# Patient Record
Sex: FEMALE | Race: WHITE | NOT HISPANIC OR LATINO | Employment: OTHER | URBAN - METROPOLITAN AREA
[De-identification: names, ages, dates, MRNs, and addresses within clinical notes are randomized per-mention and may not be internally consistent; named-entity substitution may affect disease eponyms.]

---

## 2023-04-03 DIAGNOSIS — G89.29 CHRONIC BILATERAL LOW BACK PAIN WITH LEFT-SIDED SCIATICA: ICD-10-CM

## 2023-04-03 DIAGNOSIS — M54.42 CHRONIC BILATERAL LOW BACK PAIN WITH LEFT-SIDED SCIATICA: ICD-10-CM

## 2023-04-03 RX ORDER — IBUPROFEN 600 MG/1
600 TABLET ORAL EVERY 8 HOURS PRN
COMMUNITY
Start: 2017-03-23 | End: 2023-04-03 | Stop reason: SDUPTHER

## 2023-04-03 RX ORDER — IBUPROFEN 600 MG/1
600 TABLET ORAL EVERY 8 HOURS PRN
Qty: 90 TABLET | Refills: 0 | Status: SHIPPED | OUTPATIENT
Start: 2023-04-03 | End: 2023-06-30

## 2023-04-16 DIAGNOSIS — J18.9 PNEUMONIA, UNSPECIFIED ORGANISM: ICD-10-CM

## 2023-04-17 RX ORDER — ALBUTEROL SULFATE 90 UG/1
AEROSOL, METERED RESPIRATORY (INHALATION)
Qty: 18 G | Refills: 0 | Status: SHIPPED | OUTPATIENT
Start: 2023-04-17 | End: 2024-04-02 | Stop reason: SDUPTHER

## 2023-05-03 ENCOUNTER — TELEPHONE (OUTPATIENT)
Dept: PRIMARY CARE | Facility: CLINIC | Age: 73
End: 2023-05-03
Payer: MEDICARE

## 2023-05-04 ENCOUNTER — OFFICE VISIT (OUTPATIENT)
Dept: PRIMARY CARE | Facility: CLINIC | Age: 73
End: 2023-05-04
Payer: MEDICARE

## 2023-05-04 VITALS
OXYGEN SATURATION: 96 % | HEART RATE: 115 BPM | DIASTOLIC BLOOD PRESSURE: 90 MMHG | RESPIRATION RATE: 18 BRPM | SYSTOLIC BLOOD PRESSURE: 150 MMHG | HEIGHT: 67 IN | BODY MASS INDEX: 31.8 KG/M2 | WEIGHT: 202.6 LBS

## 2023-05-04 DIAGNOSIS — M54.2 NECK PAIN: ICD-10-CM

## 2023-05-04 DIAGNOSIS — I10 HYPERTENSION, UNSPECIFIED TYPE: Primary | ICD-10-CM

## 2023-05-04 DIAGNOSIS — M48.02 CERVICAL STENOSIS OF SPINE: ICD-10-CM

## 2023-05-04 PROCEDURE — 3077F SYST BP >= 140 MM HG: CPT | Performed by: STUDENT IN AN ORGANIZED HEALTH CARE EDUCATION/TRAINING PROGRAM

## 2023-05-04 PROCEDURE — 99214 OFFICE O/P EST MOD 30 MIN: CPT | Performed by: STUDENT IN AN ORGANIZED HEALTH CARE EDUCATION/TRAINING PROGRAM

## 2023-05-04 PROCEDURE — 3080F DIAST BP >= 90 MM HG: CPT | Performed by: STUDENT IN AN ORGANIZED HEALTH CARE EDUCATION/TRAINING PROGRAM

## 2023-05-04 PROCEDURE — 93000 ELECTROCARDIOGRAM COMPLETE: CPT | Performed by: STUDENT IN AN ORGANIZED HEALTH CARE EDUCATION/TRAINING PROGRAM

## 2023-05-04 PROCEDURE — 1159F MED LIST DOCD IN RCRD: CPT | Performed by: STUDENT IN AN ORGANIZED HEALTH CARE EDUCATION/TRAINING PROGRAM

## 2023-05-04 PROCEDURE — 1160F RVW MEDS BY RX/DR IN RCRD: CPT | Performed by: STUDENT IN AN ORGANIZED HEALTH CARE EDUCATION/TRAINING PROGRAM

## 2023-05-04 RX ORDER — ASCORBIC ACID 500 MG
500 TABLET ORAL
COMMUNITY

## 2023-05-04 RX ORDER — LISINOPRIL 40 MG/1
40 TABLET ORAL DAILY
COMMUNITY
End: 2023-07-25 | Stop reason: SDUPTHER

## 2023-05-04 RX ORDER — CYANOCOBALAMIN 1000 UG/ML
INJECTION, SOLUTION INTRAMUSCULAR; SUBCUTANEOUS
COMMUNITY
End: 2024-06-03 | Stop reason: SDUPTHER

## 2023-05-04 RX ORDER — METOPROLOL SUCCINATE 25 MG/1
25 TABLET, EXTENDED RELEASE ORAL DAILY
Qty: 30 TABLET | Refills: 5 | Status: SHIPPED | OUTPATIENT
Start: 2023-05-04 | End: 2023-05-23 | Stop reason: SDUPTHER

## 2023-05-04 RX ORDER — CHOLECALCIFEROL (VITAMIN D3) 125 MCG
CAPSULE ORAL
COMMUNITY
Start: 2016-05-10

## 2023-05-04 RX ORDER — EPINEPHRINE 0.3 MG/.3ML
0.3 INJECTION SUBCUTANEOUS
COMMUNITY
End: 2023-11-06 | Stop reason: WASHOUT

## 2023-05-04 RX ORDER — SYRINGE AND NEEDLE,INSULIN,1ML 25GX1"
SYRINGE, EMPTY DISPOSABLE MISCELLANEOUS
COMMUNITY
Start: 2022-08-10 | End: 2023-10-25 | Stop reason: WASHOUT

## 2023-05-04 RX ORDER — HYDROCHLOROTHIAZIDE 12.5 MG/1
12.5 TABLET ORAL DAILY
COMMUNITY
End: 2023-11-06 | Stop reason: WASHOUT

## 2023-05-04 ASSESSMENT — PATIENT HEALTH QUESTIONNAIRE - PHQ9
1. LITTLE INTEREST OR PLEASURE IN DOING THINGS: NOT AT ALL
SUM OF ALL RESPONSES TO PHQ9 QUESTIONS 1 AND 2: 0
2. FEELING DOWN, DEPRESSED OR HOPELESS: NOT AT ALL

## 2023-05-04 NOTE — PATIENT INSTRUCTIONS
Please take your lisinopril 40mg along with the new medication of 25mg daily   We will check on your BP in 1 month.   Please bring in your BP cuff so we can compare your cuff to other.       Please stop downstairs to get your neck xray.

## 2023-05-04 NOTE — PROGRESS NOTES
Will try to replace Cardura with Tekturna     Once you stop the Cardura then start tamsulosin for urine frequency and night time urine issues     Start the tizanidine nightly for back pain      Subjective   Patient ID: Shawnee Allen is a 73 y.o. female who presents for Hypertension (Pt is here for her high  blood pressure yesterday.).    [facility] yesterday with BP upwards of 200s/110 asymptomatic  Pt was advised to proceed to the ED for which she refused. She presents today with blood pressure with continued elevation. She is currently having neck pain with movement laterally. She denies any chest pain, dizziness, sob, numbness or tingling.         Review of Systems    Objective   Physical Exam  Vitals reviewed.   Constitutional:       Appearance: Normal appearance.   Cardiovascular:      Rate and Rhythm: Regular rhythm. Tachycardia present.      Heart sounds: No murmur heard.  Pulmonary:      Effort: Pulmonary effort is normal. No respiratory distress.      Breath sounds: Normal breath sounds. No wheezing.   Musculoskeletal:      Cervical back: Neck supple.      Left lower leg: No edema.   Neurological:      Mental Status: She is alert.         Assessment/Plan   Diagnoses and all orders for this visit:  Hypertension, unspecified type  Comments:  poor control   start metoprolol suc 25mg daily   EKG performed in the clinic today without changes from previous.  Orders:  -     metoprolol succinate XL (Toprol-XL) 25 mg 24 hr tablet; Take 1 tablet (25 mg) by mouth once daily. Do not crush or chew.  -     ECG 12 lead (Clinic Performed)  Neck pain  Comments:  likely a strain will obtain xray   topical pain relievers recommended   If this does not subside consider physical therapy  Orders:  -     XR cervical spine complete 4-5 views; Future  Cervical stenosis of spine  -     XR cervical spine complete 4-5 views; Future

## 2023-05-23 ENCOUNTER — OFFICE VISIT (OUTPATIENT)
Dept: PRIMARY CARE | Facility: CLINIC | Age: 73
End: 2023-05-23
Payer: MEDICARE

## 2023-05-23 VITALS
OXYGEN SATURATION: 95 % | WEIGHT: 204.8 LBS | RESPIRATION RATE: 18 BRPM | HEIGHT: 67 IN | DIASTOLIC BLOOD PRESSURE: 82 MMHG | HEART RATE: 102 BPM | BODY MASS INDEX: 32.15 KG/M2 | SYSTOLIC BLOOD PRESSURE: 138 MMHG

## 2023-05-23 DIAGNOSIS — G89.29 CHRONIC LOW BACK PAIN WITHOUT SCIATICA, UNSPECIFIED BACK PAIN LATERALITY: ICD-10-CM

## 2023-05-23 DIAGNOSIS — F32.A ANXIETY AND DEPRESSION: ICD-10-CM

## 2023-05-23 DIAGNOSIS — M54.50 CHRONIC LOW BACK PAIN WITHOUT SCIATICA, UNSPECIFIED BACK PAIN LATERALITY: ICD-10-CM

## 2023-05-23 DIAGNOSIS — F41.9 ANXIETY AND DEPRESSION: ICD-10-CM

## 2023-05-23 DIAGNOSIS — E78.2 HYPERLIPIDEMIA, MIXED: ICD-10-CM

## 2023-05-23 DIAGNOSIS — I10 BENIGN ESSENTIAL HYPERTENSION: Primary | ICD-10-CM

## 2023-05-23 DIAGNOSIS — Z00.00 ANNUAL PHYSICAL EXAM: ICD-10-CM

## 2023-05-23 DIAGNOSIS — G25.0 ESSENTIAL TREMOR: ICD-10-CM

## 2023-05-23 DIAGNOSIS — I10 HYPERTENSION, UNSPECIFIED TYPE: ICD-10-CM

## 2023-05-23 PROBLEM — M54.2 CHRONIC NECK PAIN: Status: ACTIVE | Noted: 2023-05-23

## 2023-05-23 PROBLEM — D51.0 PERNICIOUS ANEMIA: Status: ACTIVE | Noted: 2023-05-23

## 2023-05-23 PROBLEM — M54.42 CHRONIC BILATERAL LOW BACK PAIN WITH LEFT-SIDED SCIATICA: Status: ACTIVE | Noted: 2023-05-23

## 2023-05-23 PROBLEM — E66.9 OBESITY (BMI 30-39.9): Status: ACTIVE | Noted: 2023-05-23

## 2023-05-23 PROCEDURE — 1170F FXNL STATUS ASSESSED: CPT | Performed by: STUDENT IN AN ORGANIZED HEALTH CARE EDUCATION/TRAINING PROGRAM

## 2023-05-23 PROCEDURE — 99214 OFFICE O/P EST MOD 30 MIN: CPT | Performed by: STUDENT IN AN ORGANIZED HEALTH CARE EDUCATION/TRAINING PROGRAM

## 2023-05-23 PROCEDURE — 3075F SYST BP GE 130 - 139MM HG: CPT | Performed by: STUDENT IN AN ORGANIZED HEALTH CARE EDUCATION/TRAINING PROGRAM

## 2023-05-23 PROCEDURE — 3079F DIAST BP 80-89 MM HG: CPT | Performed by: STUDENT IN AN ORGANIZED HEALTH CARE EDUCATION/TRAINING PROGRAM

## 2023-05-23 PROCEDURE — G0439 PPPS, SUBSEQ VISIT: HCPCS | Performed by: STUDENT IN AN ORGANIZED HEALTH CARE EDUCATION/TRAINING PROGRAM

## 2023-05-23 PROCEDURE — 1159F MED LIST DOCD IN RCRD: CPT | Performed by: STUDENT IN AN ORGANIZED HEALTH CARE EDUCATION/TRAINING PROGRAM

## 2023-05-23 PROCEDURE — 1160F RVW MEDS BY RX/DR IN RCRD: CPT | Performed by: STUDENT IN AN ORGANIZED HEALTH CARE EDUCATION/TRAINING PROGRAM

## 2023-05-23 RX ORDER — METOPROLOL SUCCINATE 25 MG/1
25 TABLET, EXTENDED RELEASE ORAL DAILY
Qty: 90 TABLET | Refills: 1 | Status: SHIPPED | OUTPATIENT
Start: 2023-05-23 | End: 2023-07-25 | Stop reason: DRUGHIGH

## 2023-05-23 ASSESSMENT — PATIENT HEALTH QUESTIONNAIRE - PHQ9
2. FEELING DOWN, DEPRESSED OR HOPELESS: NOT AT ALL
1. LITTLE INTEREST OR PLEASURE IN DOING THINGS: NOT AT ALL
SUM OF ALL RESPONSES TO PHQ9 QUESTIONS 1 AND 2: 0

## 2023-05-23 ASSESSMENT — ACTIVITIES OF DAILY LIVING (ADL)
DOING_HOUSEWORK: INDEPENDENT
DRESSING: INDEPENDENT
BATHING: INDEPENDENT
GROCERY_SHOPPING: INDEPENDENT
TAKING_MEDICATION: INDEPENDENT
MANAGING_FINANCES: INDEPENDENT

## 2023-05-23 NOTE — PROGRESS NOTES
"Subjective   Reason for Visit: Shawnee Allen is an 73 y.o. female here for a Medicare Wellness visit.     Past Medical, Surgical, and Family History reviewed and updated in chart.    Reviewed all medications by prescribing practitioner or clinical pharmacist (such as prescriptions, OTCs, herbal therapies and supplements) and documented in the medical record.    Lower back pain, chronic   Taking 600 mg ibuprofen daily due to pain   Stabbing pain intermittently   Better with movement   Chronic for years but worsening   Causing some difficulty walking     BP is doing much better than previously with the addition of the    Fear of falling   Feels that she is more cautious and avoiding places with lots of stairs or a lot of crowds   She is working on physical therapy on [employer]         Patient Care Team:  Stacy Arroyo DO as PCP - General  Stacy Arroyo DO as PCP - [employer] PCP     Review of Systems    Objective   Vitals:  /82   Pulse 102   Resp 18   Ht 1.702 m (5' 7\")   Wt 92.9 kg (204 lb 12.8 oz)   SpO2 95%   BMI 32.08 kg/m²       Physical Exam  Constitutional:       Appearance: Normal appearance.   Cardiovascular:      Rate and Rhythm: Normal rate and regular rhythm.      Heart sounds: No murmur heard.  Pulmonary:      Effort: Pulmonary effort is normal. No respiratory distress.      Breath sounds: Normal breath sounds. No wheezing.   Musculoskeletal:      Cervical back: Neck supple.      Left lower leg: No edema.   Neurological:      Mental Status: She is alert.         Assessment/Plan   Problem List Items Addressed This Visit          Nervous    Essential tremor    Current Assessment & Plan     Doing better with the addition of BB              Circulatory    Benign essential hypertension - Primary    Current Assessment & Plan     Doing much better   Continue lisinopril 40mg daily   Continue hydrochlorothiazide 12.5mg daily   Continue metoprolol 25mg daily             Other    " Hyperlipidemia, mixed    Anxiety and depression     Other Visit Diagnoses       Chronic low back pain without sciatica, unspecified back pain laterality        xray   chronic likely arthritis   recommended formal physical therapy but refuses as she does youtube videos  using ibuprofen 600mg daily  rec pain management    Relevant Orders    XR lumbar spine complete 4+ views    Hypertension, unspecified type        poor control   start metoprolol suc 25mg daily   EKG performed in the clinic today without changes from previous.    Relevant Medications    metoprolol succinate XL (Toprol-XL) 25 mg 24 hr tablet

## 2023-05-23 NOTE — ASSESSMENT & PLAN NOTE
Doing much better   Continue lisinopril 40mg daily   Continue hydrochlorothiazide 12.5mg daily   Continue metoprolol 25mg daily

## 2023-05-25 ENCOUNTER — TELEPHONE (OUTPATIENT)
Dept: PRIMARY CARE | Facility: CLINIC | Age: 73
End: 2023-05-25
Payer: MEDICARE

## 2023-06-08 DIAGNOSIS — G89.29 OTHER CHRONIC PAIN: Primary | ICD-10-CM

## 2023-06-26 DIAGNOSIS — G89.29 CHRONIC BILATERAL LOW BACK PAIN WITH LEFT-SIDED SCIATICA: ICD-10-CM

## 2023-06-26 DIAGNOSIS — M54.42 CHRONIC BILATERAL LOW BACK PAIN WITH LEFT-SIDED SCIATICA: ICD-10-CM

## 2023-06-30 RX ORDER — IBUPROFEN 600 MG/1
600 TABLET ORAL EVERY 8 HOURS PRN
Qty: 90 TABLET | Refills: 0 | Status: SHIPPED | OUTPATIENT
Start: 2023-06-30 | End: 2023-07-03 | Stop reason: SDUPTHER

## 2023-07-03 DIAGNOSIS — M54.42 CHRONIC BILATERAL LOW BACK PAIN WITH LEFT-SIDED SCIATICA: ICD-10-CM

## 2023-07-03 DIAGNOSIS — G89.29 CHRONIC BILATERAL LOW BACK PAIN WITH LEFT-SIDED SCIATICA: ICD-10-CM

## 2023-07-03 RX ORDER — IBUPROFEN 600 MG/1
600 TABLET ORAL EVERY 8 HOURS PRN
Qty: 90 TABLET | Refills: 0 | Status: SHIPPED | OUTPATIENT
Start: 2023-07-03

## 2023-07-03 NOTE — TELEPHONE ENCOUNTER
I called pt to inform, she said NO she can have Ibuprofen, it's the aleve she can not take. Would like this medication filled. .

## 2023-07-05 NOTE — TELEPHONE ENCOUNTER
Called and left a detailed message on her personal answering machine that the mediation was sent in and to call if she has any questions.

## 2023-07-24 PROBLEM — G25.0 BENIGN HEAD TREMOR: Status: ACTIVE | Noted: 2023-07-24

## 2023-07-24 PROBLEM — R79.89 ELEVATED LIVER FUNCTION TESTS: Status: ACTIVE | Noted: 2023-07-24

## 2023-07-24 PROBLEM — R73.9 ELEVATED BLOOD SUGAR: Status: ACTIVE | Noted: 2023-07-24

## 2023-07-24 PROBLEM — M54.12 RADICULITIS INVOLVING UPPER EXTREMITY: Status: ACTIVE | Noted: 2023-07-24

## 2023-07-24 PROBLEM — R50.9 FEVER: Status: ACTIVE | Noted: 2023-07-24

## 2023-07-25 ENCOUNTER — OFFICE VISIT (OUTPATIENT)
Dept: PRIMARY CARE | Facility: CLINIC | Age: 73
End: 2023-07-25
Payer: MEDICARE

## 2023-07-25 VITALS
DIASTOLIC BLOOD PRESSURE: 92 MMHG | HEIGHT: 67 IN | WEIGHT: 202.2 LBS | OXYGEN SATURATION: 95 % | HEART RATE: 89 BPM | RESPIRATION RATE: 17 BRPM | BODY MASS INDEX: 31.74 KG/M2 | SYSTOLIC BLOOD PRESSURE: 148 MMHG

## 2023-07-25 DIAGNOSIS — I10 HYPERTENSION, UNSPECIFIED TYPE: ICD-10-CM

## 2023-07-25 DIAGNOSIS — Z72.0 TOBACCO USE: ICD-10-CM

## 2023-07-25 DIAGNOSIS — M54.42 CHRONIC BILATERAL LOW BACK PAIN WITH LEFT-SIDED SCIATICA: ICD-10-CM

## 2023-07-25 DIAGNOSIS — I10 BENIGN ESSENTIAL HYPERTENSION: Primary | ICD-10-CM

## 2023-07-25 DIAGNOSIS — G89.29 CHRONIC BILATERAL LOW BACK PAIN WITH LEFT-SIDED SCIATICA: ICD-10-CM

## 2023-07-25 PROCEDURE — 1159F MED LIST DOCD IN RCRD: CPT | Performed by: STUDENT IN AN ORGANIZED HEALTH CARE EDUCATION/TRAINING PROGRAM

## 2023-07-25 PROCEDURE — 3080F DIAST BP >= 90 MM HG: CPT | Performed by: STUDENT IN AN ORGANIZED HEALTH CARE EDUCATION/TRAINING PROGRAM

## 2023-07-25 PROCEDURE — 99213 OFFICE O/P EST LOW 20 MIN: CPT | Performed by: STUDENT IN AN ORGANIZED HEALTH CARE EDUCATION/TRAINING PROGRAM

## 2023-07-25 PROCEDURE — 3077F SYST BP >= 140 MM HG: CPT | Performed by: STUDENT IN AN ORGANIZED HEALTH CARE EDUCATION/TRAINING PROGRAM

## 2023-07-25 PROCEDURE — 1160F RVW MEDS BY RX/DR IN RCRD: CPT | Performed by: STUDENT IN AN ORGANIZED HEALTH CARE EDUCATION/TRAINING PROGRAM

## 2023-07-25 RX ORDER — LISINOPRIL 40 MG/1
40 TABLET ORAL DAILY
Qty: 90 TABLET | Refills: 1 | Status: SHIPPED | OUTPATIENT
Start: 2023-07-25 | End: 2023-10-25 | Stop reason: SDUPTHER

## 2023-07-25 RX ORDER — METOPROLOL SUCCINATE 25 MG/1
50 TABLET, EXTENDED RELEASE ORAL DAILY
Qty: 90 TABLET | Refills: 1 | Status: SHIPPED | OUTPATIENT
Start: 2023-07-25 | End: 2023-11-13 | Stop reason: SDUPTHER

## 2023-07-25 NOTE — PROGRESS NOTES
Subjective   Patient ID: Shawnee Allen is a 73 y.o. female who presents for Follow-up (Pt is here for a follow up.).    Patient presents today for a follow-up to her high blood pressure.  Although it has improved we are still seeing blood pressures above the goal of 140/90 mmHg.  She denies any recurrent symptoms of dizziness..        Review of Systems    Objective   Physical Exam  Constitutional:       Appearance: Normal appearance.   Cardiovascular:      Rate and Rhythm: Normal rate and regular rhythm.      Heart sounds: No murmur heard.  Pulmonary:      Effort: Pulmonary effort is normal. No respiratory distress.      Breath sounds: Normal breath sounds. No wheezing.   Musculoskeletal:      Cervical back: Neck supple.      Left lower leg: No edema.   Neurological:      Mental Status: She is alert.         Assessment/Plan   Problem List Items Addressed This Visit       Chronic bilateral low back pain with left-sided sciatica     Reviewed pt xrays with her in the office today  Plan for her to follow up with pain management          Benign essential hypertension - Primary     Lisinopril 40mg daily   Increasing the metoprolol 50mg daily     Physical exam was stable. Discussed maintaining diets such as DASH to help maintain normal Blood pressure. Encouraged regular exercise for a total of 120 min weekly. We will fu labs in 1-3 days. For now there will be no change in medical management. All questions and concerns were addressed. Pt will follow up in 4-6 months or sooner if needed.            Relevant Medications    lisinopril 40 mg tablet     Other Visit Diagnoses       Hypertension, unspecified type        poor control    Relevant Medications    metoprolol succinate XL (Toprol-XL) 25 mg 24 hr tablet    Tobacco use        Relevant Orders    Vascular US abdominal aorta anuerysm AAA screening

## 2023-07-25 NOTE — ASSESSMENT & PLAN NOTE
Lisinopril 40mg daily   Increasing the metoprolol 50mg daily     Physical exam was stable. Discussed maintaining diets such as DASH to help maintain normal Blood pressure. Encouraged regular exercise for a total of 120 min weekly. We will fu labs in 1-3 days. For now there will be no change in medical management. All questions and concerns were addressed. Pt will follow up in 4-6 months or sooner if needed.

## 2023-08-08 ENCOUNTER — TELEPHONE (OUTPATIENT)
Dept: PRIMARY CARE | Facility: CLINIC | Age: 73
End: 2023-08-08
Payer: MEDICARE

## 2023-08-08 NOTE — TELEPHONE ENCOUNTER
Shawnee came in office to say she just got her ultrasound done today. She asked if when you get the results that you mail her a copy to her home address on file

## 2023-10-25 ENCOUNTER — OFFICE VISIT (OUTPATIENT)
Dept: PRIMARY CARE | Facility: CLINIC | Age: 73
End: 2023-10-25
Payer: MEDICARE

## 2023-10-25 VITALS
HEIGHT: 67 IN | RESPIRATION RATE: 17 BRPM | HEART RATE: 84 BPM | DIASTOLIC BLOOD PRESSURE: 80 MMHG | BODY MASS INDEX: 30.95 KG/M2 | OXYGEN SATURATION: 96 % | WEIGHT: 197.2 LBS | SYSTOLIC BLOOD PRESSURE: 170 MMHG

## 2023-10-25 DIAGNOSIS — I10 BENIGN ESSENTIAL HYPERTENSION: ICD-10-CM

## 2023-10-25 PROCEDURE — 3077F SYST BP >= 140 MM HG: CPT | Performed by: STUDENT IN AN ORGANIZED HEALTH CARE EDUCATION/TRAINING PROGRAM

## 2023-10-25 PROCEDURE — 1159F MED LIST DOCD IN RCRD: CPT | Performed by: STUDENT IN AN ORGANIZED HEALTH CARE EDUCATION/TRAINING PROGRAM

## 2023-10-25 PROCEDURE — 3079F DIAST BP 80-89 MM HG: CPT | Performed by: STUDENT IN AN ORGANIZED HEALTH CARE EDUCATION/TRAINING PROGRAM

## 2023-10-25 PROCEDURE — 99213 OFFICE O/P EST LOW 20 MIN: CPT | Performed by: STUDENT IN AN ORGANIZED HEALTH CARE EDUCATION/TRAINING PROGRAM

## 2023-10-25 PROCEDURE — 1160F RVW MEDS BY RX/DR IN RCRD: CPT | Performed by: STUDENT IN AN ORGANIZED HEALTH CARE EDUCATION/TRAINING PROGRAM

## 2023-10-25 RX ORDER — LISINOPRIL 40 MG/1
40 TABLET ORAL DAILY
Qty: 90 TABLET | Refills: 1 | Status: SHIPPED | OUTPATIENT
Start: 2023-10-25 | End: 2024-04-02 | Stop reason: SDUPTHER

## 2023-10-25 RX ORDER — SYRINGE WITH NEEDLE, 1 ML 27GX1/2"
SYRINGE, EMPTY DISPOSABLE MISCELLANEOUS
COMMUNITY
Start: 2023-07-19 | End: 2024-04-02 | Stop reason: SDUPTHER

## 2023-10-25 ASSESSMENT — PATIENT HEALTH QUESTIONNAIRE - PHQ9
1. LITTLE INTEREST OR PLEASURE IN DOING THINGS: NOT AT ALL
2. FEELING DOWN, DEPRESSED OR HOPELESS: NOT AT ALL
SUM OF ALL RESPONSES TO PHQ9 QUESTIONS 1 AND 2: 0

## 2023-10-25 NOTE — ASSESSMENT & PLAN NOTE
Pt is non-compliant with medications   Will refer to cardiology   Metoprolol 50mg daily   Lisinopril 40mg daily     Advised to take medications as prescribed     Physical exam was stable. Discussed maintaining diets such as DASH to help maintain normal Blood pressure. Encouraged regular exercise for a total of 120 min weekly. We will fu labs in 1-3 days. For now there will be no change in medical management. All questions and concerns were addressed. Pt will follow up in 4-6 months or sooner if needed.

## 2023-10-25 NOTE — PROGRESS NOTES
Subjective   Patient ID: Shawnee Allen is a 73 y.o. female who presents for Follow-up (Pt is here for a 3 month follow up.).    Patient presents to the office today for hypertension follow-up.  Patient states that she has not been taking her medications as prescribed.  She is currently only taking 25 mg metoprolol daily.  She denies any dizziness, headaches, nausea or vomiting symptoms.  She states that she has been very reluctant to increase any medications due to her high blood pressure.          Objective   Physical Exam  Constitutional:       Appearance: Normal appearance.   Cardiovascular:      Rate and Rhythm: Normal rate and regular rhythm.      Heart sounds: No murmur heard.  Pulmonary:      Effort: Pulmonary effort is normal. No respiratory distress.      Breath sounds: Normal breath sounds. No wheezing.   Musculoskeletal:      Cervical back: Neck supple.      Left lower leg: No edema.   Neurological:      Mental Status: She is alert.       Assessment/Plan   Problem List Items Addressed This Visit             ICD-10-CM    Benign essential hypertension I10     Pt is non-compliant with medications   Will refer to cardiology   Metoprolol 50mg daily   Lisinopril 40mg daily     Advised to take medications as prescribed     Physical exam was stable. Discussed maintaining diets such as DASH to help maintain normal Blood pressure. Encouraged regular exercise for a total of 120 min weekly. We will fu labs in 1-3 days. For now there will be no change in medical management. All questions and concerns were addressed. Pt will follow up in 4-6 months or sooner if needed.            Relevant Medications    lisinopril 40 mg tablet    Other Relevant Orders    Referral to Cardiology

## 2023-10-31 DIAGNOSIS — D51.0 PERNICIOUS ANEMIA: Primary | ICD-10-CM

## 2023-10-31 PROBLEM — C44.92 SCC (SQUAMOUS CELL CARCINOMA): Status: ACTIVE | Noted: 2023-10-31

## 2023-11-06 ENCOUNTER — OFFICE VISIT (OUTPATIENT)
Dept: CARDIOLOGY | Facility: CLINIC | Age: 73
End: 2023-11-06
Payer: MEDICARE

## 2023-11-06 VITALS
OXYGEN SATURATION: 96 % | HEIGHT: 67 IN | BODY MASS INDEX: 29.98 KG/M2 | DIASTOLIC BLOOD PRESSURE: 77 MMHG | WEIGHT: 191 LBS | SYSTOLIC BLOOD PRESSURE: 162 MMHG | RESPIRATION RATE: 18 BRPM | HEART RATE: 92 BPM

## 2023-11-06 DIAGNOSIS — R73.09 OTHER ABNORMAL GLUCOSE: ICD-10-CM

## 2023-11-06 DIAGNOSIS — I10 BENIGN ESSENTIAL HYPERTENSION: ICD-10-CM

## 2023-11-06 DIAGNOSIS — E78.2 HYPERLIPIDEMIA, MIXED: Primary | ICD-10-CM

## 2023-11-06 PROCEDURE — 3077F SYST BP >= 140 MM HG: CPT | Performed by: STUDENT IN AN ORGANIZED HEALTH CARE EDUCATION/TRAINING PROGRAM

## 2023-11-06 PROCEDURE — 1160F RVW MEDS BY RX/DR IN RCRD: CPT | Performed by: STUDENT IN AN ORGANIZED HEALTH CARE EDUCATION/TRAINING PROGRAM

## 2023-11-06 PROCEDURE — 1159F MED LIST DOCD IN RCRD: CPT | Performed by: STUDENT IN AN ORGANIZED HEALTH CARE EDUCATION/TRAINING PROGRAM

## 2023-11-06 PROCEDURE — 99204 OFFICE O/P NEW MOD 45 MIN: CPT | Performed by: STUDENT IN AN ORGANIZED HEALTH CARE EDUCATION/TRAINING PROGRAM

## 2023-11-06 PROCEDURE — 3078F DIAST BP <80 MM HG: CPT | Performed by: STUDENT IN AN ORGANIZED HEALTH CARE EDUCATION/TRAINING PROGRAM

## 2023-11-06 PROCEDURE — 99214 OFFICE O/P EST MOD 30 MIN: CPT | Performed by: STUDENT IN AN ORGANIZED HEALTH CARE EDUCATION/TRAINING PROGRAM

## 2023-11-06 RX ORDER — PRAVASTATIN SODIUM 40 MG/1
40 TABLET ORAL DAILY
Qty: 30 TABLET | Refills: 11 | Status: SHIPPED | OUTPATIENT
Start: 2023-11-06 | End: 2024-11-05

## 2023-11-06 RX ORDER — AMLODIPINE BESYLATE 10 MG/1
10 TABLET ORAL DAILY
Qty: 30 TABLET | Refills: 11 | Status: SHIPPED | OUTPATIENT
Start: 2023-11-06 | End: 2024-04-02 | Stop reason: SDUPTHER

## 2023-11-06 ASSESSMENT — ENCOUNTER SYMPTOMS: DEPRESSION: 0

## 2023-11-06 ASSESSMENT — PATIENT HEALTH QUESTIONNAIRE - PHQ9
1. LITTLE INTEREST OR PLEASURE IN DOING THINGS: NOT AT ALL
SUM OF ALL RESPONSES TO PHQ9 QUESTIONS 1 AND 2: 1
2. FEELING DOWN, DEPRESSED OR HOPELESS: SEVERAL DAYS

## 2023-11-06 NOTE — PATIENT INSTRUCTIONS
Patient Instructions:  If you have any questions or need cardiac medication refills, please call the Heart Failure office at [phone], option [phone].   You may also contact the  Heart Failure Nursing team via email at [email or web address] (Please include your name and date of birth).      To reach Dr. Webb's office please call [phone] / Fax: [phone].  To schedule an appointment call [phone].    - stop smoking  - measure your blood pressure at home  - Continue current medications with the exception of:   -- start amlodipine 10mg daily  -- start pravastatin 40mg daily  - Labwork: get labs   - Imaging/Procedures: none  - Results: I will send a LiveLeaf message with any notable results. You may also call the HF nursing team (contact details above) for your results.   - Referrals: orthopedics  - Followup: 1 year

## 2023-11-06 NOTE — PROGRESS NOTES
"Patient ID: Shawnee Allen is a 73 y.o. female.    Subjective    HPI  Treatment Synopsis:    PROBLEM LIST:  1. Pernicious anemia and vitamin B12 deficiency.  2. Hypertension.  3. Osteoarthritis.  4. Malabsorption syndrome.  5. GERD.  6. Coronary artery disease.  7. Fatty liver disease seen on ultrasound of the liver in August 2017.     PAST SURGICAL HISTORY:  1. Status post breast reduction surgery.  2. Status post resection of her right laron colon in 2014.  3. Status post hysterectomy.     INTERVAL HISTORY: The patient comes in today for her routine follow-up visit for her vitamin B12 deficiency.  She continues to give herself vitamin B12 injections once every 10-12 days. She states that when she waits morethan 10 days before the next injection, she starts feeling poorly. She has been having difficulty with her bowels witha spastic colon. She has  been having colonoscopies every two years. She has some difficulty sleeping, specificallyfalling asleep. She will wake up just as she falls asleep with the urge to urinate and sweaty. The rest of the 14-point review of systems was reviewed and negative  except as noted above.    73-year-old woman with a history of hypertension, osteoarthritis, GERD, fatty liver disease on ultrasound of the liver in August 2017, coronary artery disease, malabsorption  syndrome, followed by us for pernicious anemia and B12 deficiency which she has had since her age 20s.  She injects herself every 10 days, but if she goes beyond 10 days feels tired and her thinking is not good.        no fatigue,   sometimes shortness of breath, [she is smoking on and off again]  weakness in her legs  anxiousness: yes. \"my mind is always working.\"    poor sleep: yes   inability to concentrate at times   subpar memory   loose stools: yes  numbness and tingling sensations in toes and fingers: sometimes (usualy in a thigh)   altered taste: no    tinnitus: yes   difficulties with movement in the morning.  Has " "a cane.  no falls. She feels her balance is off  she has tremors in her neck.  \"I feel there is a lot of neurological.\"      LABORATORY: WBC of 5.1, hemoglobin of 17.2, platelet count of 254, 000. Creatinine of 1.0. Calcium of 10.5.  Normal transaminase. Normal total bilirubin. Sodium 134, potassium of 3.8, LDH of 155, vitamin B12 of 935,    Objective    BSA: There is no height or weight on file to calculate BSA.  There were no vitals taken for this visit.   Review of Systems    Physical Exam    Performance Status:        Assessment/Plan   Summary: 73-year-old woman with a history of hypertension, osteoarthritis, GERD, fatty liver disease on ultrasound of the liver in August 2017, coronary artery disease, malabsorption syndrome, followed by us for pernicious anemia and B12 deficiency.     Right hemicolectomy and terminal ileum resection(9/12/2014) no invasive carcinoma identified     Pernicious anemia/B12 deficiency   She was diagnosed in her 20s, requiring B12 injections and ultimately given the diagnosis of pernicious anemia.  At 1 point she was taking B12 injections every week.  She underwent a right hemicolectomy in 2014.  Onset: In her 20s   Low B12 level: . High methylmalonic acid: .   Recent B12 level: 2000(4/11/2023).    Ferritin 129 (4/11/2023 ) TIBC 254   Recent methylmalonic acid: .   Family history of B12 deficiency: no.   EGD: never done.   serum holotranscobalamin (holoTC): not done.   Risk groups member: no  Zollinger-Wellington syndrome: not established. [used to get heartburn]  No GAVE syndrome ((Gastric Antral Vascular Ectasia, blood vessels in the lining of the stomach become fragile and become prone to rupture, commonly called watermelon stomach) can cause pernicious anemia   No Ileal condition (resection or disease): Crohn’s: no. Blind loop syndrome: no. chronic pancreatitis: no. Gastrinoma: no. Parasites (fish tapeworm): no. Giardiasis: not established. bacterial overgrowth:    Malnutrition: by " "report. Reduced intake of animal products:  no . vegan diet: no.  celiac: no.    No high risk medications (proton pump inhibitors, nitrous oxide, metformin, Colchicine, Potassium supplements such as K-Dur, Micro-K, Slow-K, K-Lyte, phenytoin, Primidone, cholestyramine, methotrexate).   Alcohol: occasionally.  no drunkedness  Clinical features: fatigue: no. Anemia: no. Macrocytosis: no. No Hyperpigmentation.    Neurology: No distal sensory impairment. No decreased taste. No difficulties with movement.     No decreased vibratory sense. No Areflexia. No disturbance in proprioception. some impaired memory. can get irritability. No involuntary movements. No hypotonia. No other neurological features. No glossitis. No cardiomyopathy.  +tinnitus.     sensitive spots on skin  No myelosis funicularis (1. Impaired perception of deep touch, pressure and vibration, loss of sense of touch, very annoying and persistent paresthesias 2. Ataxia of dorsal column type 3. Decrease or loss of deep muscle-tendon reflexes 4. Pathological  reflexes (Babinski, Rossolimo (percussion of the tips of the toes causes an exaggerated flexion of the toes) and severe paresis).    Had a neuroMRI scan: no.    She is claustrophobic  no metal in eyes.  No pacemaker  (Look for neural tissue spongiform state, edema of fibers, deficiency of tissue, myelin decays, axial fiber decays. In later phases, fibric sclerosis of nervous tissues occurs. On \"precontrast FLAIR image\": abnormal lesions in the periventricular area  suggesting white matter pathology.)   has neck tremors. She noted \"when she gets anxious.\" Has not seen a neurologist.  Response to treatment: partial.   (Bone marrow biopsy should be done in cases where the B12 deficiency diagnosis is unclear, or blood counts show that the patient is not responding to adequate treatment.)    PLAN:  continue B12 injections Q 10 days  B12, copper level  RTC 6months     Malabsorption syndrome/ Resection right " colon 2014  Vitamin 25 D(4/11/2023) 28  taking 1000 International Unit(s) daily     GERD  asymptomatic  not taking an antacid.        CAD  no angina     Fatty liver Aug 2017  No transaminitis(1/19/2023)  She notes some alcohol intake     neck pain  s/p breast reduction surgery to help     HTN  on lisinopril/HCTZ  PLAN:   recommend home checking and record in a notebook.  Dr. Arroyo is PCP  recommended ED visit, but patient left AMA     Breast reduction surgery      Plan:    Continue B12 injections Q 2 weeks, B12 level 935'  OV 1 year       Cancer Staging   No matching staging information was found for the patient.    Oncology History    No history exists.                 Harish Cevallos MD

## 2023-11-06 NOTE — PROGRESS NOTES
Primary Care Physician: Stacy Arroyo DO  Primary Cardiologist:  none  Referring: Stacy Arroyo DO    Date of Visit: 11/06/2023  1:00 PM EST  Location of visit: [address]   Type of Visit: Consult    HPI / Summary:   Shawnee Allen is a very pleasant 73 y.o. female referred for management of hypertension. She has a history of HTN, HLD, overweight, tobacco use, aortic calcification, arthritis, anxiety, pernicious anemia.      She is accompanied by: self    Has been very active but had decline in function with COVID. Feels like she does not go out as much. Has started doing therapy and youtube videos for anxiety. In the past month she walks around block as most activity.. She uses a cane for balance. Concerned about falls. Has a BP cuff at home usually 150/70s usually. She has neuropathy and gets back pain when walking,     Has seen Dr. Stacy Arroyo and had consistently elevated BPs desptie lisinopril and metoprolol. Was referred for management. She is hesitant to take meds.     She denies: dyspnea at rest, LE swelling, syncope, PND, orthopnea, chest pain, palpitation.    ROS: ROS Full 10 pt review of symptoms of negative unless discussed above.     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 She packs 1/2 ppd of tobacco, Has smoked for many years (quit in her 30s but restarted). Has tried vaping  Notes that father had aortic aneurysm and required surgery.     Objective Problems:   Patient Active Problem List   Diagnosis    Chronic neck pain    Chronic bilateral low back pain with left-sided sciatica    Hyperlipidemia, mixed    Obesity (BMI 30-39.9)    Pernicious anemia    Benign essential hypertension    Anxiety and depression    Essential tremor    Adenomatous polyp of colon    Elevated blood sugar    Elevated liver function tests    Fever    Radiculitis involving upper extremity    Benign head tremor    Polyp of colon, adenomatous    SCC (squamous cell carcinoma)     Medical History:   No past medical  "history on file.  Surgical Hx:   Past Surgical History:   Procedure Laterality Date    APPENDECTOMY  05/10/2016    Appendectomy    BREAST SURGERY  05/10/2016    Breast Surgery Reduction Procedure    COLON SURGERY  05/10/2016    Colon Surgery    COLONOSCOPY  05/10/2016    Colonoscopy (Fiberoptic)    HYSTERECTOMY  05/10/2016    Hysterectomy      Social Hx:   Tobacco Use: High Risk (11/6/2023)    Patient History     Smoking Tobacco Use: Some Days     Smokeless Tobacco Use: Never     Passive Exposure: Not on file     Alcohol Use: Not on file     Family Hx:   Family History   Problem Relation Name Age of Onset    Esophageal cancer Mother      Lung cancer Mother      Allergies Mother      Arthritis Mother      Heart disease Mother      Hypertension Mother      Bone cancer Father      Heart disease Father      Hypertension Father      Allergies Father      Arthritis Father       Allergies:   Allergies   Allergen Reactions    Naproxen Sodium Shortness of breath    Ampicillin Unknown    Bee Venom Protein (Honey Bee) Unknown    Erythromycin Unknown    Diazepam Hives and Rash     IV    Penicillins Rash     Exam:   Vitals:       11/6/2023    12:52 PM 10/25/2023    11:40 AM 10/25/2023    11:27 AM 7/25/2023    11:10 AM 5/23/2023    11:07 AM 5/4/2023     4:22 PM   Vitals   Systolic 162 170 188 148 138 150   Diastolic 77 80 92 92 82 90   Heart Rate 92  84 89 102    Resp 18  17 17 18    Height (in) 1.702 m (5' 7\")  1.702 m (5' 7\") 1.702 m (5' 7\") 1.702 m (5' 7\")    Weight (lb) 191  197.2 202.2 204.8    BMI 29.91 kg/m2  30.89 kg/m2 31.67 kg/m2 32.08 kg/m2    BSA (m2) 2.02 m2  2.06 m2 2.08 m2 2.1 m2    Visit Report Report Report Report Report Report Report     GEN: Pleasant, well-appearing, no acute distress.  HEENT: JVP not elevated, no icterus. No HJR. No bruit  CHEST: No wheeze, good air movement.  CV: Normal rate, regular rhythm. Normal S1, split S2, no m/r/g  ABD: Soft, ND, NT  EXT: Warm, well perfused, No LE edema.   NEURO: " "Pleasant, Oriented to plan    Labs:   CMP:  Recent Labs     04/26/23  1107 08/29/22  1202 08/24/21  1119 09/22/20  1125 12/04/19  1145 06/03/19  1110 05/22/19  1135 11/02/18  1435    137 134 137 137   < > 138 135   K 3.7 3.8 3.8 4.1 3.8   < > 3.8 3.9    101 101 104 102   < > 105 103   CO2 25 24 22* 26 25   < > 25 21*   ANIONGAP 15 16 11 8 10   < > 8 11   BUN 11 12 11 13 16   < > 14 13   CREATININE 0.89 0.81 1.0 0.9 0.9   < > 0.8 0.8   EGFR  --   --  58 66 66  --  75 76    < > = values in this interval not displayed.     Recent Labs     04/26/23  1107 08/29/22  1202 08/24/21  1119 09/22/20  1125 12/04/19  1145   ALBUMIN 4.5 4.5 4.5 4.5 4.4   ALKPHOS 77 81 90 68 74   ALT 22 19 33 18 18   AST 15 15 19 13 13   BILITOT 0.5 0.4 0.5 0.3 0.3     CBC:  Recent Labs     04/26/23  1107 08/29/22  1202 08/24/21  1119 09/22/20  1125 12/04/19  1145   WBC 5.2 6.3 5.1 5.5 5.3   HGB 15.6 15.7 17.2* 16.1* 16.0*   HCT 45.9 46.9* 49.5* 47.8* 48.6*    289 254 245 240   MCV 94 94 93.9 95.0 96.8     COAG: No results for input(s): \"PTT\", \"INR\", \"HAUF\", \"DDIMERVTE\", \"HAPTOGLOBIN\", \"FIBRINOGEN\" in the last 00817 hours.  ABO: No results for input(s): \"ABO\" in the last 05491 hours.  HEME/ENDO:  Recent Labs     04/26/23  1107 09/06/22  1539 08/29/22  1202 06/04/19  0953   FERRITIN 417*  --  324*  --    IRONSAT 32 27  --   --    HGBA1C  --   --   --  5.6      CARDIAC:   Recent Labs     08/24/21  1119 09/22/20  1125 12/04/19  1145 05/22/19  1135 11/02/18  1435    147 167 167 157     Recent Labs     06/03/19  1110   CHOL 286*   LDLF 192*   HDL 63.5   TRIG 152*     MICRO: No results for input(s): \"ESR\", \"CRP\", \"PROCAL\" in the last 66723 hours.  No results found for the last 90 days.    Notable Studies: imaging personally reviewed   EKG:  Encounter Date: 05/04/23   ECG 12 lead (Clinic Performed)    Narrative    Sinus tach   Similar to previous        Echocardiogram: No results found for this or any previous visit from the " "past 1825 days.    Stress Testing: No results found for this or any previous visit from the past 1825 days.    Cardiac Catheterization: No results found for this or any previous visit from the past 1825 days.  No results found for this or any previous visit from the past 3650 days.     Cardiac Scoring: No results found for this or any previous visit from the past 1825 days.    AAA :   AAA Screen Aorta/Common Iliac Arteries/IVC: Negative AAA screening. Calcified plaque seen throughout aorta noted. 8/10/2023     OTHER: No results found for this or any previous visit from the past 1825 days.    ASCVD Risk: The ASCVD Risk score (Mirtha MIGUEL, et al., 2019) failed to calculate for the following reasons:    Cannot find a previous HDL lab    Cannot find a previous total cholesterol lab    Current Outpatient Medications   Medication Instructions    albuterol 90 mcg/actuation inhaler INHALE 1 TO 2 PUFFS BY MOUTH EVERY 4 TO 6 HOURS AS NEEDED    amLODIPine (NORVASC) 10 mg, oral, Daily    ascorbic acid (VITAMIN C) 500 mg, oral, Daily RT    carbamide peroxide (Debrox) 6.5 % otic solution 5 drops, otic (ear)    cholecalciferol (Vitamin D-3) 125 MCG (5000 UT) capsule oral    cyanocobalamin (Vitamin B-12) 1,000 mcg/mL injection INJECT 1000 MICROGRAM(S) INTRAMUSCULARLY EVERY 2 WEEKS    ibuprofen 600 mg, oral, Every 8 hours PRN    lisinopril 40 mg, oral, Daily    melatonin 10 mg tablet,chewable oral    metoprolol succinate XL (TOPROL-XL) 50 mg, oral, Daily, Do not crush or chew.    pravastatin (PRAVACHOL) 40 mg, oral, Daily    syringe with needle 1 mL 25 gauge x 1\" syringe USE EVERY 2 WEEKS WITH B12 INJECTIONS      Notable Therapies   Class  Agent    ARNI / ACE / ARB  Lisinopril 40mg daily    Beta-Blocker  Metoprolol succinate 50mg daily    MRA      SGLT2      Diuretic      Hydralazine/ISDN      Anticoagulation      Anti-arrhythmic      Antiplatelet      Lipid-Lowering  START Pravastatin 40mg daily    Other   START amlodipine 10mg daily "    Device(s)      Cardiac Rehab, if EF <35/MI       Problems Addressed:   # Hypertension: Last BP: 162/77. On ACE/BB. Would recommend CCB with amlodipine 10mg daily. Goal BP <130/75  # Hyperlipidemia: last checked in 2019 and abnormal, known aortic plaque. Start pravastatin 40mg daily  # Tobacco: tobacco cessation counselling with referral.   - 10 year ASCVD risk 64% based on current risk (driven by HTN, tobacco and hyperlipidemia). Explained risk stroke, MI, renal failure with current risk profile. Recommended aggressive BP, lipid control. Additionally check for type II DM.   - She is willing to start these medications  - strongly recommended tobacco cessation. Declines referral to CV tobacco cessation group, will quit on her own.   - additional benefit to jennifer peña for diet/nutrition support for DASH diet.     # Back pain: orthopedics referral    Patient Instructions   Patient Instructions:  If you have any questions or need cardiac medication refills, please call the Heart Failure office at [phone], option [phone].   You may also contact the  Heart Failure Nursing team via email at [email or web address] (Please include your name and date of birth).      To reach Dr. Webb's office please call [phone] / Fax: [phone].  To schedule an appointment call [phone].    - stop smoking  - measure your blood pressure at home  - Continue current medications with the exception of:   -- start amlodipine 10mg daily  -- start pravastatin 40mg daily  - Labwork: get labs   - Imaging/Procedures: none  - Results: I will send a [employer] message with any notable results. You may also call the HF nursing team (contact details above) for your results.   - Referrals: orthopedics  - Followup: 1 year     Orders:  Orders Placed This Encounter   Procedures    Lipid panel    Hemoglobin A1C    Referral to Jennifer Peña    Referral to Orthopaedic Surgery      Followup Appts:  Future Appointments   Date Time Provider  Department Center   12/5/2023 10:30 AM Harish Cevallos MD [id number] [facility]   1/25/2024 10:20 AM Stacy Arroyo DO [id number] [facility]   11/11/2024  2:00 PM Luke Webb MD [id number] [facility]        ____________________________________________________________  Luke Webb MD  Section of Advanced Heart Failure and Cardiac Transplantation  Division of Cardiovascular Medicine  [facility]  [facility]

## 2023-11-08 ENCOUNTER — APPOINTMENT (OUTPATIENT)
Dept: HEMATOLOGY/ONCOLOGY | Facility: CLINIC | Age: 73
End: 2023-11-08
Payer: MEDICARE

## 2023-11-12 DIAGNOSIS — I10 HYPERTENSION, UNSPECIFIED TYPE: ICD-10-CM

## 2023-11-13 RX ORDER — METOPROLOL SUCCINATE 25 MG/1
25 TABLET, EXTENDED RELEASE ORAL DAILY
Qty: 90 TABLET | Refills: 1 | OUTPATIENT
Start: 2023-11-13

## 2023-11-13 RX ORDER — METOPROLOL SUCCINATE 25 MG/1
50 TABLET, EXTENDED RELEASE ORAL DAILY
Qty: 90 TABLET | Refills: 1 | Status: SHIPPED | OUTPATIENT
Start: 2023-11-13 | End: 2024-03-05

## 2023-12-03 NOTE — PROGRESS NOTES
Patient ID: Shawnee Allen is a 73 y.o. female.    Subjective    HPI  PROBLEM LIST:  1. Pernicious anemia and vitamin B12 deficiency.  2. Hypertension.  3. Osteoarthritis.  4. Malabsorption syndrome.  5. GERD.  6. Coronary artery disease.  7. Fatty liver disease seen on ultrasound of the liver in August 2017.     PAST SURGICAL HISTORY:  1. Status post breast reduction surgery.  2. Status post resection of her right laron colon in 2014.  3. Status post hysterectomy.     INTERVAL HISTORY: The patient comes in today for her routine follow-up visit for her vitamin B12 deficiency.  She continues to give herself vitamin B12 injections once every 10-12 days. She states that when she waits morethan 10 days before the next injection, she starts feeling poorly. She has been having difficulty with her bowels witha spastic colon. She has  been having colonoscopies every two years. She has some difficulty sleeping, specificallyfalling asleep. She will wake up just as she falls asleep with the urge to urinate and sweaty. The rest of the 14-point review of systems was reviewed and negative  except as noted above.       Objective    BSA: There is no height or weight on file to calculate BSA.  There were no vitals taken for this visit.     Physical Exam    Performance Status:        Assessment/Plan   Summary: 73-year-old woman with a history of hypertension, osteoarthritis, GERD, fatty liver disease on ultrasound of the liver in August 2017, coronary artery disease, malabsorption syndrome, followed by us for pernicious anemia and B12 deficiency.     Right hemicolectomy and terminal ileum resection(9/12/2014) no invasive carcinoma identified     Pernicious anemia/B12 deficiency   She was diagnosed in her 20s, requiring B12 injections and ultimately given the diagnosis of pernicious anemia.  At 1 point she was taking B12 injections every week.  She underwent a right hemicolectomy in 2014.  Onset: In her 20s   Low B12 level: . High  methylmalonic acid: .   Recent B12 level: 2000(4/11/2023).    Ferritin 129 (4/11/2023 ) TIBC 254   Recent methylmalonic acid: .   Family history of B12 deficiency: no.   EGD: never done.   serum holotranscobalamin (holoTC): not done.   Risk groups member: no  Zollinger-Wellington syndrome: not established. [used to get heartburn]  No GAVE syndrome ((Gastric Antral Vascular Ectasia, blood vessels in the lining of the stomach become fragile and become prone to rupture, commonly called watermelon stomach) can cause pernicious anemia   No Ileal condition (resection or disease): Crohn’s: no. Blind loop syndrome: no. chronic pancreatitis: no. Gastrinoma: no. Parasites (fish tapeworm): no. Giardiasis: not established. bacterial overgrowth:    Malnutrition: by report. Reduced intake of animal products:  no . vegan diet: no.  celiac: no.    No high risk medications (proton pump inhibitors, nitrous oxide, metformin, Colchicine, Potassium supplements such as K-Dur, Micro-K, Slow-K, K-Lyte, phenytoin, Primidone, cholestyramine, methotrexate).   Alcohol: occasionally.  no drunkedness  Clinical features: fatigue: no. Anemia: no. Macrocytosis: no. No Hyperpigmentation.    Neurology: No distal sensory impairment. No decreased taste. No difficulties with movement.     No decreased vibratory sense. No Areflexia. No disturbance in proprioception. some impaired memory. can get irritability. No involuntary movements. No hypotonia. No other neurological features. No glossitis. No cardiomyopathy.  +tinnitus.     sensitive spots on skin  No myelosis funicularis (1. Impaired perception of deep touch, pressure and vibration, loss of sense of touch, very annoying and persistent paresthesias 2. Ataxia of dorsal column type 3. Decrease or loss of deep muscle-tendon reflexes 4. Pathological  reflexes (Babinski, Rossolimo (percussion of the tips of the toes causes an exaggerated flexion of the toes) and severe paresis).    Had a neuroMRI scan: no.   "  She is claustrophobic  no metal in eyes.  No pacemaker  (Look for neural tissue spongiform state, edema of fibers, deficiency of tissue, myelin decays, axial fiber decays. In later phases, fibric sclerosis of nervous tissues occurs. On \"precontrast FLAIR image\": abnormal lesions in the periventricular area  suggesting white matter pathology.)   has neck tremors. She noted \"when she gets anxious.\" Has not seen a neurologist.  Response to treatment: partial.   (Bone marrow biopsy should be done in cases where the B12 deficiency diagnosis is unclear, or blood counts show that the patient is not responding to adequate treatment.)    PLAN:  continue B12 injections Q 10 days  B12, copper level  RTC 6months     Malabsorption syndrome/ Resection right colon 2014  Vitamin 25 D(4/11/2023) 28  taking 1000 International Unit(s) daily     GERD  asymptomatic  not taking an antacid.        CAD  no angina     Fatty liver Aug 2017  No transaminitis(1/19/2023)  She notes some alcohol intake     neck pain  s/p breast reduction surgery to help     HTN  on lisinopril/HCTZ  PLAN:   recommend home checking and record in a notebook.  Dr. Arroyo is PCP  recommended ED visit, but patient left AMA     Breast reduction surgery      Plan:    Continue B12 injections Q 2 weeks, B12 level 935'  OV 1 year    Cancer Staging   No matching staging information was found for the patient.    Oncology History    No history exists.              Harish Cevallos MD            "

## 2023-12-05 ENCOUNTER — APPOINTMENT (OUTPATIENT)
Dept: HEMATOLOGY/ONCOLOGY | Facility: CLINIC | Age: 73
End: 2023-12-05
Payer: MEDICARE

## 2024-01-08 ENCOUNTER — LAB (OUTPATIENT)
Dept: LAB | Facility: CLINIC | Age: 74
End: 2024-01-08
Payer: MEDICARE

## 2024-01-08 DIAGNOSIS — D51.0 PERNICIOUS ANEMIA: ICD-10-CM

## 2024-01-08 LAB
ALBUMIN SERPL BCP-MCNC: 4.3 G/DL (ref 3.4–5)
ALP SERPL-CCNC: 73 U/L (ref 33–136)
ALT SERPL W P-5'-P-CCNC: 18 U/L (ref 7–45)
ANION GAP SERPL CALC-SCNC: 14 MMOL/L (ref 10–20)
AST SERPL W P-5'-P-CCNC: 14 U/L (ref 9–39)
BASOPHILS # BLD AUTO: 0.02 X10*3/UL (ref 0–0.1)
BASOPHILS NFR BLD AUTO: 0.3 %
BILIRUB SERPL-MCNC: 0.4 MG/DL (ref 0–1.2)
BUN SERPL-MCNC: 14 MG/DL (ref 6–23)
CALCIUM SERPL-MCNC: 9.8 MG/DL (ref 8.6–10.3)
CHLORIDE SERPL-SCNC: 103 MMOL/L (ref 98–107)
CO2 SERPL-SCNC: 24 MMOL/L (ref 21–32)
CREAT SERPL-MCNC: 0.77 MG/DL (ref 0.5–1.05)
EGFRCR SERPLBLD CKD-EPI 2021: 81 ML/MIN/1.73M*2
EOSINOPHIL # BLD AUTO: 0.04 X10*3/UL (ref 0–0.4)
EOSINOPHIL NFR BLD AUTO: 0.6 %
ERYTHROCYTE [DISTWIDTH] IN BLOOD BY AUTOMATED COUNT: 13.2 % (ref 11.5–14.5)
GLUCOSE SERPL-MCNC: 118 MG/DL (ref 74–99)
HCT VFR BLD AUTO: 45.1 % (ref 36–46)
HGB BLD-MCNC: 14.9 G/DL (ref 12–16)
IMM GRANULOCYTES # BLD AUTO: 0.06 X10*3/UL (ref 0–0.5)
IMM GRANULOCYTES NFR BLD AUTO: 0.9 % (ref 0–0.9)
IRON SATN MFR SERPL: 25 % (ref 25–45)
IRON SERPL-MCNC: 103 UG/DL (ref 35–150)
LYMPHOCYTES # BLD AUTO: 2.14 X10*3/UL (ref 0.8–3)
LYMPHOCYTES NFR BLD AUTO: 31.8 %
MCH RBC QN AUTO: 30.6 PG (ref 26–34)
MCHC RBC AUTO-ENTMCNC: 33 G/DL (ref 32–36)
MCV RBC AUTO: 93 FL (ref 80–100)
MONOCYTES # BLD AUTO: 0.4 X10*3/UL (ref 0.05–0.8)
MONOCYTES NFR BLD AUTO: 6 %
NEUTROPHILS # BLD AUTO: 4.06 X10*3/UL (ref 1.6–5.5)
NEUTROPHILS NFR BLD AUTO: 60.4 %
NRBC BLD-RTO: 0 /100 WBCS (ref 0–0)
PLATELET # BLD AUTO: 278 X10*3/UL (ref 150–450)
POTASSIUM SERPL-SCNC: 3.6 MMOL/L (ref 3.5–5.3)
PROT SERPL-MCNC: 7.1 G/DL (ref 6.4–8.2)
RBC # BLD AUTO: 4.87 X10*6/UL (ref 4–5.2)
SODIUM SERPL-SCNC: 137 MMOL/L (ref 136–145)
TIBC SERPL-MCNC: 414 UG/DL (ref 240–445)
UIBC SERPL-MCNC: 311 UG/DL (ref 110–370)
VIT B12 SERPL-MCNC: 564 PG/ML (ref 211–911)
WBC # BLD AUTO: 6.7 X10*3/UL (ref 4.4–11.3)

## 2024-01-08 PROCEDURE — 83921 ORGANIC ACID SINGLE QUANT: CPT

## 2024-01-08 PROCEDURE — 83540 ASSAY OF IRON: CPT

## 2024-01-08 PROCEDURE — 82607 VITAMIN B-12: CPT

## 2024-01-08 PROCEDURE — 85025 COMPLETE CBC W/AUTO DIFF WBC: CPT

## 2024-01-08 PROCEDURE — 36415 COLL VENOUS BLD VENIPUNCTURE: CPT

## 2024-01-08 PROCEDURE — 83010 ASSAY OF HAPTOGLOBIN QUANT: CPT

## 2024-01-08 PROCEDURE — 84075 ASSAY ALKALINE PHOSPHATASE: CPT

## 2024-01-09 LAB — HAPTOGLOB SERPL-MCNC: 293 MG/DL (ref 37–246)

## 2024-01-10 PROBLEM — F41.8 MIXED ANXIETY DEPRESSIVE DISORDER: Status: ACTIVE | Noted: 2023-05-23

## 2024-01-10 PROBLEM — R79.89 ABNORMAL LIVER FUNCTION TESTS: Status: ACTIVE | Noted: 2022-11-22

## 2024-01-10 PROBLEM — I10 BENIGN ESSENTIAL HYPERTENSION: Status: ACTIVE | Noted: 2018-10-18

## 2024-01-10 PROBLEM — D51.0 VITAMIN B12 DEFICIENCY ANEMIA DUE TO INTRINSIC FACTOR DEFICIENCY: Status: ACTIVE | Noted: 2018-10-18

## 2024-01-10 PROBLEM — M54.50 LOW BACK PAIN, UNSPECIFIED: Status: ACTIVE | Noted: 2023-05-23

## 2024-01-10 PROBLEM — C44.92 SQUAMOUS CELL CARCINOMA OF SKIN: Status: ACTIVE | Noted: 2023-10-31

## 2024-01-10 PROBLEM — M54.12 BRACHIAL RADICULITIS: Status: ACTIVE | Noted: 2018-11-12

## 2024-01-10 PROBLEM — E78.2 MIXED HYPERLIPIDEMIA: Status: ACTIVE | Noted: 2018-10-18

## 2024-01-10 NOTE — PROGRESS NOTES
"Patient ID: Shawnee Allen is a 73 y.o. female.    Subjective    HPI  73-year-old woman with a history of hypertension, osteoarthritis, GERD, fatty liver disease on ultrasound of the liver in August 2017, coronary artery disease, malabsorption  syndrome, followed by us for pernicious anemia and B12 deficiency which she has had since her age 20s.  She injects herself every 10 days, but if she goes beyond 10 days feels tired and her thinking is not good.       INTERVAL HISTORY: Shawnee comes in for routine follow-up visit for vitamin B12 deficiency.  She continues to give herself vitamin B12 injections once every 10-12 days. She states that when she waits morethan 10 days before the next injection, she starts feeling poorly. She has been having difficulty with her bowels witha spastic colon. She has  been having colonoscopies every two years. She has some difficulty sleeping, specificallyfalling asleep. She will wake up just as she falls asleep with the urge to urinate and sweaty. The rest of the 14-point review of systems was reviewed and negative  except as noted above.     no fatigue,   sometimes shortness of breath, [she is smoking on and off again]  weakness in her legs  anxiousness: yes. \"my mind is always working.\"    poor sleep: yes   inability to concentrate at times   subpar memory   loose stools: yes  numbness and tingling sensations in toes and fingers: sometimes (usualy in a thigh)   altered taste: no    tinnitus: yes   difficulties with movement in the morning.  Has a cane.  no falls. She feels her balance is off  she has tremors in her neck.  \"I feel there is a lot of neurological.\"     PROBLEM LIST:  1. Pernicious anemia and vitamin B12 deficiency.  2. Hypertension.  3. Osteoarthritis.  4. Malabsorption syndrome.  5. GERD.  6. Coronary artery disease.  7. Fatty liver disease seen on ultrasound of the liver in August 2017.     PAST SURGICAL HISTORY:  1. Status post breast reduction surgery.  2. " Status post resection of her right laron colon in 2014.  3. Status post hysterectomy.        LABORATORY: WBC of 5.1, hemoglobin of 17.2, platelet count of 254, 000. Creatinine of 1.0. Calcium of 10.5.  Normal transaminase. Normal total bilirubin. Sodium 134, potassium of 3.8, LDH of 155, vitamin B12 of 935,    Objective    BSA: There is no height or weight on file to calculate BSA.  There were no vitals taken for this visit.   Review of Systems   Constitutional:  Positive for diaphoresis. Negative for fatigue.   HENT:  Negative for trouble swallowing.    Eyes:  Negative for visual disturbance.   Respiratory:  Negative for shortness of breath.    Cardiovascular:  Negative for leg swelling.   Gastrointestinal:  Negative for blood in stool.   Endocrine: Positive for cold intolerance.   Genitourinary:  Negative for dysuria.   Musculoskeletal:  Positive for gait problem.   Skin:  Negative for rash.   Allergic/Immunologic: Negative for environmental allergies.   Neurological:  Negative for headaches.   Hematological:  Bruises/bleeds easily.   Psychiatric/Behavioral:  Positive for sleep disturbance.        Physical Exam  Constitutional:       Appearance: She is obese.   HENT:      Head: Normocephalic and atraumatic.      Right Ear: External ear normal.      Left Ear: External ear normal.      Nose: Nose normal.      Mouth/Throat:      Mouth: Mucous membranes are moist.      Pharynx: Oropharynx is clear.   Eyes:      Conjunctiva/sclera: Conjunctivae normal.      Pupils: Pupils are equal, round, and reactive to light.   Cardiovascular:      Rate and Rhythm: Normal rate and regular rhythm.      Heart sounds: No murmur heard.     No friction rub. No gallop.   Pulmonary:      Effort: Pulmonary effort is normal.      Breath sounds: Normal breath sounds.   Abdominal:      General: Bowel sounds are normal.   Musculoskeletal:         General: Normal range of motion.      Cervical back: Normal range of motion.   Skin:     General:  Skin is warm and dry.   Neurological:      General: No focal deficit present.      Mental Status: She is alert and oriented to person, place, and time.   Psychiatric:         Mood and Affect: Mood normal.         Behavior: Behavior normal.         Performance Status:        Assessment/Plan   Summary: 73-year-old woman with a history of hypertension, osteoarthritis, GERD, fatty liver disease on ultrasound of the liver in August 2017, coronary artery disease, malabsorption syndrome, followed by us for pernicious anemia and B12 deficiency.     Right hemicolectomy and terminal ileum resection(9/12/2014) no invasive carcinoma identified     Pernicious anemia/B12 deficiency   She was diagnosed in her 20s, requiring B12 injections and ultimately given the diagnosis of pernicious anemia.  At 1 point she was taking B12 injections every week.  She underwent a right hemicolectomy in 2014.  Onset: In her 20s   Low B12 level: . High methylmalonic acid: .   B12 level: 2000(4/11/2023).    Ferritin 129 (4/11/2023 ) TIBC 254   Recent methylmalonic acid: .   Family history of B12 deficiency: no.   EGD: never done.   serum holotranscobalamin (holoTC): not done.   Risk groups member: no  Zollinger-Wellington syndrome: not established. [used to get heartburn]  No GAVE syndrome ((Gastric Antral Vascular Ectasia, blood vessels in the lining of the stomach become fragile and become prone to rupture, commonly called watermelon stomach) can cause pernicious anemia   No Ileal condition (resection or disease): Crohn’s: no. Blind loop syndrome: no. chronic pancreatitis: no. Gastrinoma: no. Parasites (fish tapeworm): no. Giardiasis: not established. bacterial overgrowth:    Malnutrition: by report. Reduced intake of animal products:  no . vegan diet: no.  celiac: no.    No high risk medications (proton pump inhibitors, nitrous oxide, metformin, Colchicine, Potassium supplements such as K-Dur, Micro-K, Slow-K, K-Lyte, phenytoin, Primidone,  "cholestyramine, methotrexate).   Alcohol: occasionally.  no drunkedness  Clinical features: fatigue: no. Anemia: no. Macrocytosis: no. No Hyperpigmentation.    Neurology: No distal sensory impairment. No decreased taste. No difficulties with movement.     No decreased vibratory sense. No Areflexia. No disturbance in proprioception. some impaired memory. can get irritability. No involuntary movements. No hypotonia. No other neurological features. No glossitis. No cardiomyopathy.  +tinnitus.     sensitive spots on skin  No myelosis funicularis (1. Impaired perception of deep touch, pressure and vibration, loss of sense of touch, very annoying and persistent paresthesias 2. Ataxia of dorsal column type 3. Decrease or loss of deep muscle-tendon reflexes 4. Pathological  reflexes (Babinski, Rossolimo (percussion of the tips of the toes causes an exaggerated flexion of the toes) and severe paresis).    Had a neuroMRI scan: no.    She is claustrophobic  no metal in eyes.  No pacemaker  (Look for neural tissue spongiform state, edema of fibers, deficiency of tissue, myelin decays, axial fiber decays. In later phases, fibric sclerosis of nervous tissues occurs. On \"precontrast FLAIR image\": abnormal lesions in the periventricular area  suggesting white matter pathology.)   has neck tremors. She noted \"when she gets anxious.\" Has not seen a neurologist.  Response to treatment: partial.   (Bone marrow biopsy should be done in cases where the B12 deficiency diagnosis is unclear, or blood counts show that the patient is not responding to adequate treatment.)  1/8/2024 normal CBC, B12 584  PLAN:  continue B12 injections Q 10 days  B12, copper level  RTC 1year     Malabsorption syndrome/ Resection right colon 2014  Vitamin 25 D(4/11/2023) 28  taking 1000 International Unit(s) daily     GERD  asymptomatic  not taking an antacid.        CAD  no angina     Fatty liver Aug 2017  No transaminitis(1/19/2023)  She notes some alcohol " intake, vodka     neck pain  s/p breast reduction surgery to help     HTN  on lisinopril/HCTZ  PLAN:   recommend home checking and record in a notebook.  Dr. Arroyo is PCP  recommended ED visit, but patient left AMA     Cancer screening  Status post breast reduction surgery  Recommended bilateral mammogram at least once every 2 years if not yearly              Harish Cevallos MD

## 2024-01-11 LAB — METHYLMALONATE SERPL-SCNC: 0.17 UMOL/L (ref 0–0.4)

## 2024-01-12 ENCOUNTER — OFFICE VISIT (OUTPATIENT)
Dept: HEMATOLOGY/ONCOLOGY | Facility: CLINIC | Age: 74
End: 2024-01-12
Payer: MEDICARE

## 2024-01-12 VITALS
HEART RATE: 109 BPM | DIASTOLIC BLOOD PRESSURE: 80 MMHG | SYSTOLIC BLOOD PRESSURE: 155 MMHG | TEMPERATURE: 96.6 F | RESPIRATION RATE: 18 BRPM | BODY MASS INDEX: 30.66 KG/M2 | OXYGEN SATURATION: 92 % | WEIGHT: 195.77 LBS

## 2024-01-12 DIAGNOSIS — D51.0 PERNICIOUS ANEMIA: ICD-10-CM

## 2024-01-12 DIAGNOSIS — R79.9 ABNORMAL FINDING OF BLOOD CHEMISTRY, UNSPECIFIED: ICD-10-CM

## 2024-01-12 DIAGNOSIS — E53.8 B12 DEFICIENCY: Primary | ICD-10-CM

## 2024-01-12 PROCEDURE — 99214 OFFICE O/P EST MOD 30 MIN: CPT | Performed by: INTERNAL MEDICINE

## 2024-01-12 PROCEDURE — 3077F SYST BP >= 140 MM HG: CPT | Performed by: INTERNAL MEDICINE

## 2024-01-12 PROCEDURE — 3079F DIAST BP 80-89 MM HG: CPT | Performed by: INTERNAL MEDICINE

## 2024-01-12 RX ORDER — CYANOCOBALAMIN 1000 UG/ML
INJECTION, SOLUTION INTRAMUSCULAR; SUBCUTANEOUS
Qty: 1 ML | Refills: 11 | Status: CANCELLED
Start: 2024-02-15

## 2024-01-12 RX ORDER — SYRINGE WITH NEEDLE, 1 ML 27GX1/2"
SYRINGE, EMPTY DISPOSABLE MISCELLANEOUS
Qty: 2 EACH | Refills: 11 | Status: CANCELLED
Start: 2024-02-15

## 2024-01-12 ASSESSMENT — ENCOUNTER SYMPTOMS
BRUISES/BLEEDS EASILY: 1
DIAPHORESIS: 1
DEPRESSION: 1
LOSS OF SENSATION IN FEET: 0
TROUBLE SWALLOWING: 0
SLEEP DISTURBANCE: 1
SHORTNESS OF BREATH: 0
FATIGUE: 0
HEADACHES: 0
DYSURIA: 0
BLOOD IN STOOL: 0
OCCASIONAL FEELINGS OF UNSTEADINESS: 0

## 2024-01-12 ASSESSMENT — PATIENT HEALTH QUESTIONNAIRE - PHQ9
2. FEELING DOWN, DEPRESSED OR HOPELESS: SEVERAL DAYS
SUM OF ALL RESPONSES TO PHQ9 QUESTIONS 1 AND 2: 2
10. IF YOU CHECKED OFF ANY PROBLEMS, HOW DIFFICULT HAVE THESE PROBLEMS MADE IT FOR YOU TO DO YOUR WORK, TAKE CARE OF THINGS AT HOME, OR GET ALONG WITH OTHER PEOPLE: NOT DIFFICULT AT ALL
1. LITTLE INTEREST OR PLEASURE IN DOING THINGS: SEVERAL DAYS

## 2024-01-12 ASSESSMENT — COLUMBIA-SUICIDE SEVERITY RATING SCALE - C-SSRS
1. IN THE PAST MONTH, HAVE YOU WISHED YOU WERE DEAD OR WISHED YOU COULD GO TO SLEEP AND NOT WAKE UP?: NO
2. HAVE YOU ACTUALLY HAD ANY THOUGHTS OF KILLING YOURSELF?: NO
6. HAVE YOU EVER DONE ANYTHING, STARTED TO DO ANYTHING, OR PREPARED TO DO ANYTHING TO END YOUR LIFE?: NO

## 2024-01-12 NOTE — PATIENT INSTRUCTIONS
Follow up in 1 year.     Please feel free to call with any questions or concerns at [phone].

## 2024-01-12 NOTE — PROGRESS NOTES
"Patient here today for follow up. She was seen by cardiology and no cardiac issues of note. Card did start at statin but caused intolerable joint and neck pain.     Fatigue: \"so-so\"    Medications reviewed with patient.     Recent labs printed and given to patient per her request.   Follow up in 1 year with labs.     "

## 2024-01-25 ENCOUNTER — APPOINTMENT (OUTPATIENT)
Dept: PRIMARY CARE | Facility: CLINIC | Age: 74
End: 2024-01-25
Payer: MEDICARE

## 2024-02-29 DIAGNOSIS — I10 HYPERTENSION, UNSPECIFIED TYPE: ICD-10-CM

## 2024-03-05 RX ORDER — METOPROLOL SUCCINATE 25 MG/1
50 TABLET, EXTENDED RELEASE ORAL DAILY
Qty: 180 TABLET | Refills: 0 | Status: SHIPPED | OUTPATIENT
Start: 2024-03-05 | End: 2024-04-02 | Stop reason: SDUPTHER

## 2024-04-02 ENCOUNTER — OFFICE VISIT (OUTPATIENT)
Dept: PRIMARY CARE | Facility: CLINIC | Age: 74
End: 2024-04-02
Payer: MEDICARE

## 2024-04-02 VITALS
RESPIRATION RATE: 16 BRPM | WEIGHT: 197.4 LBS | SYSTOLIC BLOOD PRESSURE: 138 MMHG | HEIGHT: 66 IN | HEART RATE: 107 BPM | DIASTOLIC BLOOD PRESSURE: 62 MMHG | BODY MASS INDEX: 31.72 KG/M2 | OXYGEN SATURATION: 97 %

## 2024-04-02 DIAGNOSIS — I10 HYPERTENSION, UNSPECIFIED TYPE: ICD-10-CM

## 2024-04-02 DIAGNOSIS — E78.2 HYPERLIPIDEMIA, MIXED: ICD-10-CM

## 2024-04-02 DIAGNOSIS — I10 BENIGN ESSENTIAL HYPERTENSION: ICD-10-CM

## 2024-04-02 DIAGNOSIS — D51.0 PERNICIOUS ANEMIA: Primary | ICD-10-CM

## 2024-04-02 PROCEDURE — 3075F SYST BP GE 130 - 139MM HG: CPT | Performed by: STUDENT IN AN ORGANIZED HEALTH CARE EDUCATION/TRAINING PROGRAM

## 2024-04-02 PROCEDURE — 3078F DIAST BP <80 MM HG: CPT | Performed by: STUDENT IN AN ORGANIZED HEALTH CARE EDUCATION/TRAINING PROGRAM

## 2024-04-02 PROCEDURE — 1159F MED LIST DOCD IN RCRD: CPT | Performed by: STUDENT IN AN ORGANIZED HEALTH CARE EDUCATION/TRAINING PROGRAM

## 2024-04-02 PROCEDURE — 99214 OFFICE O/P EST MOD 30 MIN: CPT | Performed by: STUDENT IN AN ORGANIZED HEALTH CARE EDUCATION/TRAINING PROGRAM

## 2024-04-02 PROCEDURE — 1160F RVW MEDS BY RX/DR IN RCRD: CPT | Performed by: STUDENT IN AN ORGANIZED HEALTH CARE EDUCATION/TRAINING PROGRAM

## 2024-04-02 RX ORDER — AMLODIPINE BESYLATE 10 MG/1
10 TABLET ORAL DAILY
Qty: 90 TABLET | Refills: 1 | Status: SHIPPED | OUTPATIENT
Start: 2024-04-02 | End: 2024-09-29

## 2024-04-02 RX ORDER — LISINOPRIL 40 MG/1
40 TABLET ORAL DAILY
Qty: 90 TABLET | Refills: 1 | Status: SHIPPED | OUTPATIENT
Start: 2024-04-02

## 2024-04-02 RX ORDER — ALBUTEROL SULFATE 90 UG/1
AEROSOL, METERED RESPIRATORY (INHALATION)
Qty: 18 G | Refills: 3 | Status: SHIPPED | OUTPATIENT
Start: 2024-04-02

## 2024-04-02 RX ORDER — SYRINGE WITH NEEDLE, 1 ML 27GX1/2"
SYRINGE, EMPTY DISPOSABLE MISCELLANEOUS
Qty: 100 EACH | Refills: 0 | Status: SHIPPED | OUTPATIENT
Start: 2024-04-02

## 2024-04-02 RX ORDER — METOPROLOL SUCCINATE 25 MG/1
50 TABLET, EXTENDED RELEASE ORAL DAILY
Qty: 180 TABLET | Refills: 1 | Status: SHIPPED | OUTPATIENT
Start: 2024-04-02 | End: 2024-09-29

## 2024-04-02 ASSESSMENT — PATIENT HEALTH QUESTIONNAIRE - PHQ9
SUM OF ALL RESPONSES TO PHQ9 QUESTIONS 1 AND 2: 0
2. FEELING DOWN, DEPRESSED OR HOPELESS: NOT AT ALL
1. LITTLE INTEREST OR PLEASURE IN DOING THINGS: NOT AT ALL

## 2024-04-02 NOTE — PROGRESS NOTES
"Subjective   Patient ID: Shawnee Allen is a 74 y.o. female who presents for Med Refill (Pt is here for medication refills.).  HPI    Pt hematologist is leaving soon. Pt would like us to take over management of the pernicious anemia.      Left leg numbness  Poor Balance    Pt self discharge her lyrica      Objective   Physical Exam  Vitals reviewed.   Constitutional:       Appearance: Normal appearance.   Cardiovascular:      Rate and Rhythm: Normal rate and regular rhythm.      Heart sounds: No murmur heard.  Pulmonary:      Effort: Pulmonary effort is normal. No respiratory distress.      Breath sounds: Normal breath sounds. No wheezing.   Musculoskeletal:      Cervical back: Neck supple.      Left lower leg: No edema.   Neurological:      Mental Status: She is alert.         Assessment/Plan   Problem List Items Addressed This Visit             ICD-10-CM    Benign essential hypertension I10    Relevant Medications    albuterol 90 mcg/actuation inhaler    amLODIPine (Norvasc) 10 mg tablet    lisinopril 40 mg tablet    Pernicious anemia - Primary D51.0    Relevant Medications    syringe with needle 1 mL 25 gauge x 1\" syringe     Other Visit Diagnoses         Codes    Hyperlipidemia, mixed     E78.2    Relevant Medications    amLODIPine (Norvasc) 10 mg tablet    Hypertension, unspecified type     I10    poor control     Relevant Medications    metoprolol succinate XL (Toprol-XL) 25 mg 24 hr tablet          73-year-old woman with a history of hypertension, right hemicolectomy and terminal ileum resection (9/12/2014) osteoarthritis, GERD, fatty liver disease on ultrasound of the liver in August 2017, coronary artery disease, malabsorption syndrome,  pernicious anemia and B12 deficiency who presents to 6 month follow up.     HTN  Will refer to cardiology   Metoprolol 50mg daily   Lisinopril 40mg daily   Amlodipine 10mg daily      Physical exam was stable. Discussed maintaining diets such as DASH to help maintain " normal Blood pressure. Encouraged regular exercise for a total of 120 min weekly. We will fu labs in 1-3 days. For now there will be no change in medical management. All questions and concerns were addressed. Pt will follow up in 4-6 months or sooner if needed.      B12 deficiency/Pernicious anemia    Pt self administers her B12 at home every 3-4 weeks   Refilled her syringes     Poor balance   Pt to continue her b12 injections   Consider balance physical therapy  Health Maintenance   Topic Date Due    Bone Density Scan  Never done    Derm Melanoma Skin Check  Never done    Pneumococcal Vaccine: 65+ Years (1 of 2 - PCV) Never done    Hepatitis C Screening  Never done    DTaP/Tdap/Td Vaccines (1 - Tdap) Never done    RSV Pregnant patients and/or  patients aged 60+ years (1 - 1-dose 60+ series) Never done    Zoster Vaccines (2 of 3) 02/26/2016    COVID-19 Vaccine (1 - 2023-24 season) Never done    Medicare Annual Wellness Visit (AWV)  05/24/2024    Lipid Panel  06/03/2024    Influenza Vaccine (Season Ended) 09/01/2024    HIB Vaccines  Aged Out    Hepatitis B Vaccines  Aged Out    IPV Vaccines  Aged Out    Hepatitis A Vaccines  Aged Out    Meningococcal Vaccine  Aged Out    Rotavirus Vaccines  Aged Out    HPV Vaccines  Aged Out    Colorectal Cancer Screening  Discontinued    Irritable Bowel Syndrome  Discontinued                Stacy Arroyo DO 04/29/24 7:26 PM

## 2024-06-03 ENCOUNTER — TELEPHONE (OUTPATIENT)
Dept: HEMATOLOGY/ONCOLOGY | Facility: CLINIC | Age: 74
End: 2024-06-03
Payer: MEDICARE

## 2024-06-03 DIAGNOSIS — D51.0 VITAMIN B12 DEFICIENCY ANEMIA DUE TO INTRINSIC FACTOR DEFICIENCY: Primary | ICD-10-CM

## 2024-06-03 RX ORDER — CYANOCOBALAMIN 1000 UG/ML
INJECTION, SOLUTION INTRAMUSCULAR; SUBCUTANEOUS
Qty: 3 ML | Refills: 11 | Status: SHIPPED | OUTPATIENT
Start: 2024-06-03 | End: 2024-06-03 | Stop reason: WASHOUT

## 2024-06-03 RX ORDER — CYANOCOBALAMIN 1000 UG/ML
INJECTION, SOLUTION INTRAMUSCULAR; SUBCUTANEOUS
Qty: 3 ML | Refills: 11 | Status: SHIPPED | OUTPATIENT
Start: 2024-06-03

## 2024-06-03 NOTE — PROGRESS NOTES
Reviewed previous providers note.   Refill placed on cyanocobalamin (vitamin b12) injections  1000mcg every 10 days

## 2024-08-30 DIAGNOSIS — E78.2 HYPERLIPIDEMIA, MIXED: ICD-10-CM

## 2024-08-30 DIAGNOSIS — D51.0 PERNICIOUS ANEMIA: ICD-10-CM

## 2024-08-30 DIAGNOSIS — I10 HYPERTENSION, UNSPECIFIED TYPE: ICD-10-CM

## 2024-08-30 DIAGNOSIS — I10 BENIGN ESSENTIAL HYPERTENSION: ICD-10-CM

## 2024-08-30 RX ORDER — SYRINGE WITH NEEDLE, 1 ML 27GX1/2"
SYRINGE, EMPTY DISPOSABLE MISCELLANEOUS
Qty: 100 EACH | Refills: 0 | Status: SHIPPED | OUTPATIENT
Start: 2024-08-30

## 2024-09-03 RX ORDER — METOPROLOL SUCCINATE 25 MG/1
50 TABLET, EXTENDED RELEASE ORAL DAILY
Qty: 180 TABLET | Refills: 1 | Status: SHIPPED | OUTPATIENT
Start: 2024-09-03 | End: 2025-03-02

## 2024-09-03 RX ORDER — AMLODIPINE BESYLATE 10 MG/1
10 TABLET ORAL DAILY
Qty: 90 TABLET | Refills: 1 | Status: SHIPPED | OUTPATIENT
Start: 2024-09-03

## 2024-10-02 ENCOUNTER — APPOINTMENT (OUTPATIENT)
Dept: PRIMARY CARE | Facility: CLINIC | Age: 74
End: 2024-10-02
Payer: MEDICARE

## 2024-10-03 DIAGNOSIS — I10 BENIGN ESSENTIAL HYPERTENSION: ICD-10-CM

## 2024-10-03 RX ORDER — LISINOPRIL 40 MG/1
40 TABLET ORAL DAILY
Qty: 90 TABLET | Refills: 0 | Status: SHIPPED | OUTPATIENT
Start: 2024-10-03

## 2024-11-08 NOTE — PROGRESS NOTES
Primary Care Physician: Stacy Arroyo DO  Primary Cardiologist:  none  Referring: Stacy Arroyo DO    Date of Visit: 11/11/2024  2:00 PM EST  Location of visit: [address]   Type of Visit: Established  Last visit: 11/6/2023    HPI / Summary:   Shawnee Allen is a very pleasant 74 y.o. female referred for management of hypertension. She has a history of HTN, HLD, overweight, tobacco use, aortic calcification, arthritis, anxiety, pernicious anemia.      Initial visit 11/6/2023:  Has been very active but had decline in function with COVID. Feels like she does not go out as much. Has started doing therapy and youtube videos for anxiety. In the past month she walks around block as most activity. She uses a cane for balance. Concerned about falls. Has a BP cuff at home usually 150/70s usually. She has neuropathy and gets back pain when walking,     Has seen Dr. Stacy Arroyo and had consistently elevated BPs desptie lisinopril and metoprolol. Was referred for management. She is hesitant to take meds. :    Today:   She is accompanied by: self    She started statin and got severe myalgias so stopped    She denies: dyspnea at rest, LE swelling, syncope, PND, orthopnea, chest pain, palpitation.      ROS: ROS Full 10 pt review of symptoms of negative unless discussed above.     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 She packs 1/2 ppd of tobacco, Has smoked for many years (quit in her 30s but restarted). Has tried vaping  Notes that father had aortic aneurysm and required surgery.     Objective   Medical History:   She has no past medical history on file.  Surgical Hx:   She has a past surgical history that includes Appendectomy (05/10/2016); Colon surgery (05/10/2016); Breast surgery (05/10/2016); Hysterectomy (05/10/2016); and Colonoscopy (05/10/2016).   Social Hx:   She reports that she has been smoking cigarettes. She has been exposed to tobacco smoke. She has never used smokeless tobacco.  "She reports current alcohol use. She reports that she does not currently use drugs.  Family Hx:   Her family history includes Allergies in her father and mother; Arthritis in her father and mother; Bone cancer in her father; Esophageal cancer in her mother; Heart disease in her father and mother; Hypertension in her father and mother; Lung cancer in her mother.     Allergies:   Allergies   Allergen Reactions    Naproxen Sodium Shortness of breath    Ampicillin Unknown    Aspirin, Buffered-Pravastatin Myalgia    Bee Venom Protein (Honey Bee) Unknown    Erythromycin Unknown    Diazepam Hives and Rash     IV    Penicillins Rash     Exam:   Vitals:       11/11/2024     2:01 PM 4/2/2024     1:44 PM 1/12/2024     9:26 AM 11/6/2023    12:52 PM 10/25/2023    11:40 AM 10/25/2023    11:27 AM   Vitals   Systolic 135 138 155 162 170 188   Diastolic 77 62 80 77 80 92   Heart Rate 101 107 109 92  84   Temp   35.9 °C (96.6 °F)      Resp  16 18 18  17   Height (in) 1.702 m (5' 7\") 1.676 m (5' 6\")  1.702 m (5' 7\")  1.702 m (5' 7\")   Weight (lb) 193 197.4 195.77 191  197.2   BMI 30.23 kg/m2 31.86 kg/m2 30.66 kg/m2 29.91 kg/m2  30.89 kg/m2   BSA (m2) 2.03 m2 2.04 m2 2.05 m2 2.02 m2  2.06 m2   Visit Report Report Report Report Report Report Report     GEN: Pleasant, well-appearing, no acute distress.  HEENT: JVP not elevated, no icterus. No HJR. No bruit  CHEST: No wheeze, good air movement.  CV: Normal rate, regular rhythm. Normal S1, split S2, no m/r/g  ABD: Soft, ND, NT  EXT: Warm, well perfused, No LE edema.   NEURO: Pleasant, Oriented to plan    Labs:   CMP:  Recent Labs     01/08/24  1117 04/26/23  1107 08/29/22  1202 08/24/21  1119 09/22/20  1125 12/04/19  1145 06/03/19  1110 05/22/19  1135    137 137 134 137 137   < > 138   K 3.6 3.7 3.8 3.8 4.1 3.8   < > 3.8    101 101 101 104 102   < > 105   CO2 24 25 24 22* 26 25   < > 25   ANIONGAP 14 15 16 11 8 10   < > 8   BUN 14 11 12 11 13 16   < > 14   CREATININE 0.77 0.89 " "0.81 1.0 0.9 0.9   < > 0.8   EGFR 81  --   --  58 66 66  --  75    < > = values in this interval not displayed.     Recent Labs     01/08/24  1117 04/26/23  1107 08/29/22  1202 08/24/21  1119 09/22/20  1125   ALBUMIN 4.3 4.5 4.5 4.5 4.5   ALKPHOS 73 77 81 90 68   ALT 18 22 19 33 18   AST 14 15 15 19 13   BILITOT 0.4 0.5 0.4 0.5 0.3     CBC:  Recent Labs     01/08/24  1117 04/26/23  1107 08/29/22  1202 08/24/21  1119 09/22/20  1125   WBC 6.7 5.2 6.3 5.1 5.5   HGB 14.9 15.6 15.7 17.2* 16.1*   HCT 45.1 45.9 46.9* 49.5* 47.8*    239 289 254 245   MCV 93 94 94 93.9 95.0     COAG:   Recent Labs     01/08/24  1117   HAPTOGLOBIN 293*     ABO: No results for input(s): \"ABO\" in the last 74696 hours.  HEME/ENDO:  Recent Labs     01/08/24  1117 04/26/23  1107 09/06/22  1539 08/29/22  1202 06/04/19  0953   FERRITIN  --  417*  --  324*  --    IRONSAT 25 32 27  --   --    HGBA1C  --   --   --   --  5.6      CARDIAC:   Recent Labs     08/24/21  1119 09/22/20  1125 12/04/19  1145 05/22/19  1135 11/02/18  1435    147 167 167 157     Recent Labs     06/03/19  1110   CHOL 286*   LDLF 192*   HDL 63.5   TRIG 152*     MICRO: No results for input(s): \"ESR\", \"CRP\", \"PROCAL\" in the last 93925 hours.  No results found for the last 90 days.    Notable Studies: imaging personally reviewed   EKG:  No results found for this or any previous visit (from the past 4464 hours).    Echocardiogram: No results found for this or any previous visit from the past 1825 days.    Stress Testing: No results found for this or any previous visit from the past 1825 days.    Cardiac Catheterization: No results found for this or any previous visit from the past 1825 days.  No results found for this or any previous visit from the past 3650 days.     Cardiac Scoring: No results found for this or any previous visit from the past 1825 days.    AAA :   AAA Screen Aorta/Common Iliac Arteries/IVC: Negative AAA screening. Calcified plaque seen throughout aorta " "noted. 8/10/2023     OTHER: No results found for this or any previous visit from the past 1825 days.    ASCVD Risk: The ASCVD Risk score (Mirtha DK, et al., 2019) failed to calculate for the following reasons:    Cannot find a previous HDL lab    Cannot find a previous total cholesterol lab    Current Outpatient Medications   Medication Instructions    albuterol 90 mcg/actuation inhaler INHALE 1 TO 2 PUFFS BY MOUTH EVERY 4 TO 6 HOURS AS NEEDED    amLODIPine (NORVASC) 10 mg, oral, Daily    ascorbic acid (VITAMIN C) 500 mg, Daily RT    carbamide peroxide (Debrox) 6.5 % otic solution 5 drops    cholecalciferol (Vitamin D-3) 125 MCG (5000 UT) capsule Take by mouth.    cyanocobalamin (Vitamin B-12) 1,000 mcg/mL injection vitamin B12. 1000 mcg subcutaneous injection every 10 days. Disp with needle and syringe for administration    ibuprofen 600 mg, oral, Every 8 hours PRN    lisinopril 40 mg, oral, Daily    melatonin 10 mg tablet,chewable Chew.    metoprolol succinate XL (TOPROL-XL) 50 mg, oral, Daily, Do not crush or chew.    pravastatin (PRAVACHOL) 40 mg, oral, Daily    syringe with needle 1 mL 25 gauge x 1\" syringe USE EVERY 2 WEEKS WITH B12 INJECTIONS      Notable Therapies   Class  Agent SAFETY    *ARNI* / ACE / ARB  Lisinopril 40mg daily Last BP: 135/77, Last BUN/Cr (GFR): 1/8/2024: 14/0.77 (81)    *Beta-Blocker*  Metoprolol succinate 50mg daily Last HR: 101    *MRA*   Last K: 1/8/2024: 3.6     *SGLT2*   Last A1c 6/4/2019: 5.6     Diuretic   Last BNP: No results found for requested labs within last 3650 days.    Hydralazine/ISDN       Digoxin  Last Digoxin level: No results found for requested labs within last 3650 days.    Anticoagulation   Last Hgb: 1/8/2024: 14.9    Anti-arrhythmic   Last QTc: No results found for requested labs within last 3650 days.    Antiplatelet   Last Platelet: 1/8/2024: 278    Lipid-Lowering  Start ezetemibe, consider repatha Last Tchol 6/3/2019: 286 / LDL 6/3/2019: 192 / HDL 6/3/2019: " 63.5 / TRIG 6/3/2019: 152    Other   amlodipine 10mg daily     Device(s)   EF: No results found for requested labs within last 3650 days.%, QRS: No results found for requested labs within last 3650 days.ms    Cardiac Rehab,   if EF <35/MI/OHS          Problems Addressed:   # Hypertension: Last BP: 135/77. On ACE/BB/CCB. Goal BP <130/75. States unwilling to start new BP drug.   # Hyperlipidemia: Last checked in 2019 and abnormal, known aortic plaque. Started pravastatin but patient stopped. Start Zetia. Consider repatha as unable to tolerate pravastatin and Zocor  # Tobacco Use: She reports that she has been smoking cigarettes. She has been exposed to tobacco smoke. She has never used smokeless tobacco.. 3 minutes spent discussing cessation  - 10 year ASCVD risk 36% based on current risk (driven by HTN, tobacco and hyperlipidemia). Explained risk stroke, MI, renal failure with current risk profile. Recommended aggressive BP, lipid control. Additionally check for type II DM.   - strongly recommended tobacco cessation. Declines referral to CV tobacco cessation group, will quit on her own.   - recommend heart healthy/Mediterranean diet  - start Zetia. Consider repatha      Patient Instructions   Patient Instructions:  If you have any questions or need cardiac medication refills, please call the Heart Failure office at [phone], option 6.   You may also contact the  Heart Failure Nursing team via email at [email or web address] (Please include your name and date of birth).      To reach Dr. Webb's office please call [phone] / Fax: [phone].  To schedule an appointment call [phone].    - Work to stop smoking  - Continue current medications with the exception of:   -- Zetia/ezetemibe 10mg daily  --- we will consider Repatha injection  - Labwork: Get labwork (fasting)  - Imaging/Procedures: none  - Referrals: none  - Followup: 1 year      Cardiac Diet Instructions:   To keep your heart  healthy, here are some dietary guidelines to follow:    Focus on Fresh Fruits and Vegetables: Aim to make fresh fruits and vegetables a significant part of each meal. Choose these over processed carbohydrates, which are often found in packaged or refined foods.  Limit Red Meat: Try to reduce your intake of red meat. When you do eat it, keep portion sizes small and choose lean cuts. Opt for other protein sources more often.  Incorporate Healthy Fats:  Olive Oil: Use about 1 tablespoon of olive oil each day as your main source of fat.  Avocados and Tree Nuts: These provide healthy omega-3 fats, which are beneficial for your heart. Aim for small servings of nuts like almonds, walnuts, and pistachios (a handful a day) and include avocado in your meals when possible.  Choose Plant-Based Proteins: Beans and legumes (such as lentils, chickpeas, and black beans) are excellent sources of both protein and fiber. They’re a great substitute for animal-based proteins.  Consider the Mediterranean Diet: This diet emphasizes fresh produce, whole grains, healthy fats, lean proteins, and minimal processed foods. Studies show it helps reduce the risk of heart disease. Following this diet can help guide your food choices toward heart health.    By following these guidelines, you’re taking meaningful steps toward improving and protecting your heart health.       Orders:  No orders of the defined types were placed in this encounter.     Followup Appts:  Future Appointments   Date Time Provider Department Center   12/2/2024  8:20 AM Stacy Arroyo DO [id number]     Time Spent: I spent 37 minutes reviewing medical testing, obtaining medical history and counselling and educating on diagnosis and documenting clinical encounter. The encounter involved a comprehensive review of extensive data, including prior medical records, imaging studies, laboratory results, and consultations, followed by in-depth counseling regarding the patient's  complex cardiac condition and comorbidities. We discussed treatment options, risks and benefits, and recommendations for ongoing care and careful monitoring of adverse effects from medications.     ____________________________________________________________  Luke Webb MD  Section of Advanced Heart Failure and Cardiac Transplantation  Division of Cardiovascular Medicine  [facility]  [facility]

## 2024-11-11 ENCOUNTER — OFFICE VISIT (OUTPATIENT)
Dept: CARDIOLOGY | Facility: CLINIC | Age: 74
End: 2024-11-11
Payer: MEDICARE

## 2024-11-11 VITALS
BODY MASS INDEX: 30.29 KG/M2 | OXYGEN SATURATION: 96 % | WEIGHT: 193 LBS | HEIGHT: 67 IN | DIASTOLIC BLOOD PRESSURE: 77 MMHG | SYSTOLIC BLOOD PRESSURE: 135 MMHG | HEART RATE: 101 BPM

## 2024-11-11 DIAGNOSIS — R79.9 ABNORMAL FINDING OF BLOOD CHEMISTRY, UNSPECIFIED: ICD-10-CM

## 2024-11-11 DIAGNOSIS — I10 BENIGN ESSENTIAL HYPERTENSION: ICD-10-CM

## 2024-11-11 DIAGNOSIS — E78.2 MIXED HYPERLIPIDEMIA: Primary | ICD-10-CM

## 2024-11-11 PROCEDURE — 99406 BEHAV CHNG SMOKING 3-10 MIN: CPT | Performed by: STUDENT IN AN ORGANIZED HEALTH CARE EDUCATION/TRAINING PROGRAM

## 2024-11-11 PROCEDURE — 3078F DIAST BP <80 MM HG: CPT | Performed by: STUDENT IN AN ORGANIZED HEALTH CARE EDUCATION/TRAINING PROGRAM

## 2024-11-11 PROCEDURE — 93005 ELECTROCARDIOGRAM TRACING: CPT | Performed by: STUDENT IN AN ORGANIZED HEALTH CARE EDUCATION/TRAINING PROGRAM

## 2024-11-11 PROCEDURE — 3075F SYST BP GE 130 - 139MM HG: CPT | Performed by: STUDENT IN AN ORGANIZED HEALTH CARE EDUCATION/TRAINING PROGRAM

## 2024-11-11 PROCEDURE — 93010 ELECTROCARDIOGRAM REPORT: CPT | Performed by: STUDENT IN AN ORGANIZED HEALTH CARE EDUCATION/TRAINING PROGRAM

## 2024-11-11 PROCEDURE — 1159F MED LIST DOCD IN RCRD: CPT | Performed by: STUDENT IN AN ORGANIZED HEALTH CARE EDUCATION/TRAINING PROGRAM

## 2024-11-11 PROCEDURE — 99214 OFFICE O/P EST MOD 30 MIN: CPT | Performed by: STUDENT IN AN ORGANIZED HEALTH CARE EDUCATION/TRAINING PROGRAM

## 2024-11-11 PROCEDURE — 99417 PROLNG OP E/M EACH 15 MIN: CPT | Performed by: STUDENT IN AN ORGANIZED HEALTH CARE EDUCATION/TRAINING PROGRAM

## 2024-11-11 RX ORDER — EZETIMIBE 10 MG/1
10 TABLET ORAL DAILY
Qty: 90 TABLET | Refills: 3 | Status: SHIPPED | OUTPATIENT
Start: 2024-11-11 | End: 2025-11-11

## 2024-11-11 ASSESSMENT — PATIENT HEALTH QUESTIONNAIRE - PHQ9
SUM OF ALL RESPONSES TO PHQ9 QUESTIONS 1 AND 2: 1
2. FEELING DOWN, DEPRESSED OR HOPELESS: SEVERAL DAYS
1. LITTLE INTEREST OR PLEASURE IN DOING THINGS: NOT AT ALL

## 2024-11-11 ASSESSMENT — ENCOUNTER SYMPTOMS: DEPRESSION: 0

## 2024-11-11 NOTE — PATIENT INSTRUCTIONS
If you have any questions or need cardiac medication refills, please call the Heart Failure office at [phone], option 6.  You may also contact the  Heart Failure Nursing team via email at [email or web address] (Please include your name and date of birth). To reach Dr. Webb's office please call [phone] / Fax: [phone].     If we placed a referral today for a specialist/procedure and you did not otherwise receive a phone number to schedule, please call the general scheduling line at [phone]. You may also schedule appointments on the [facility].     For radiology scheduling (Cardiac calcium score, CTA with HeartFlow, Cardiac MRI, NM cardiac stress test, PET viability study) the phone number is [phone].     To schedule an office appointment call [phone].      - Work to stop smoking  - Continue current medications with the exception of:   -- Zetia/ezetemibe 10mg daily  --- we will consider Repatha injection  - Labwork: Get labwork (fasting)  - Imaging/Procedures: none  - Referrals: none  - Followup: 1 year

## 2024-11-15 LAB
ATRIAL RATE: 99 BPM
P AXIS: 56 DEGREES
P OFFSET: 185 MS
P ONSET: 127 MS
PR INTERVAL: 184 MS
Q ONSET: 219 MS
QRS COUNT: 16 BEATS
QRS DURATION: 134 MS
QT INTERVAL: 384 MS
QTC CALCULATION(BAZETT): 492 MS
QTC FREDERICIA: 453 MS
R AXIS: 75 DEGREES
T AXIS: 41 DEGREES
T OFFSET: 411 MS
VENTRICULAR RATE: 99 BPM

## 2024-12-02 ENCOUNTER — APPOINTMENT (OUTPATIENT)
Dept: PRIMARY CARE | Facility: CLINIC | Age: 74
End: 2024-12-02
Payer: MEDICARE

## 2024-12-04 ENCOUNTER — TELEPHONE (OUTPATIENT)
Dept: PRIMARY CARE | Facility: CLINIC | Age: 74
End: 2024-12-04
Payer: MEDICARE

## 2024-12-04 NOTE — TELEPHONE ENCOUNTER
----- Message from Gale Giordano sent at 12/3/2024 11:48 AM EST -----  Regarding: Needs rescheduled  Migdalia called this patient and told her office would call to reschedule yesterday's apt

## 2024-12-11 ENCOUNTER — LAB (OUTPATIENT)
Dept: LAB | Facility: LAB | Age: 74
End: 2024-12-11
Payer: MEDICARE

## 2024-12-11 ENCOUNTER — APPOINTMENT (OUTPATIENT)
Dept: PRIMARY CARE | Facility: CLINIC | Age: 74
End: 2024-12-11
Payer: MEDICARE

## 2024-12-11 VITALS
OXYGEN SATURATION: 97 % | HEIGHT: 67 IN | DIASTOLIC BLOOD PRESSURE: 68 MMHG | SYSTOLIC BLOOD PRESSURE: 130 MMHG | HEART RATE: 94 BPM | BODY MASS INDEX: 30.7 KG/M2 | WEIGHT: 195.6 LBS

## 2024-12-11 DIAGNOSIS — E78.2 MIXED HYPERLIPIDEMIA: ICD-10-CM

## 2024-12-11 DIAGNOSIS — I10 BENIGN ESSENTIAL HYPERTENSION: ICD-10-CM

## 2024-12-11 DIAGNOSIS — Z00.00 ROUTINE GENERAL MEDICAL EXAMINATION AT HEALTH CARE FACILITY: Primary | ICD-10-CM

## 2024-12-11 DIAGNOSIS — D51.0 PERNICIOUS ANEMIA: ICD-10-CM

## 2024-12-11 DIAGNOSIS — G89.29 CHRONIC BILATERAL LOW BACK PAIN WITH LEFT-SIDED SCIATICA: ICD-10-CM

## 2024-12-11 DIAGNOSIS — M19.90 OSTEOARTHRITIS, UNSPECIFIED OSTEOARTHRITIS TYPE, UNSPECIFIED SITE: ICD-10-CM

## 2024-12-11 DIAGNOSIS — R79.9 ABNORMAL FINDING OF BLOOD CHEMISTRY, UNSPECIFIED: ICD-10-CM

## 2024-12-11 DIAGNOSIS — M54.42 CHRONIC BILATERAL LOW BACK PAIN WITH LEFT-SIDED SCIATICA: ICD-10-CM

## 2024-12-11 DIAGNOSIS — G25.0 ESSENTIAL TREMOR: ICD-10-CM

## 2024-12-11 LAB
ALBUMIN SERPL BCP-MCNC: 4.3 G/DL (ref 3.4–5)
ALP SERPL-CCNC: 74 U/L (ref 33–136)
ALT SERPL W P-5'-P-CCNC: 16 U/L (ref 7–45)
ANION GAP SERPL CALC-SCNC: 14 MMOL/L (ref 10–20)
AST SERPL W P-5'-P-CCNC: 13 U/L (ref 9–39)
BASOPHILS # BLD AUTO: 0.03 X10*3/UL (ref 0–0.1)
BASOPHILS NFR BLD AUTO: 0.5 %
BILIRUB SERPL-MCNC: 0.5 MG/DL (ref 0–1.2)
BUN SERPL-MCNC: 12 MG/DL (ref 6–23)
CALCIUM SERPL-MCNC: 10 MG/DL (ref 8.6–10.3)
CHLORIDE SERPL-SCNC: 104 MMOL/L (ref 98–107)
CHOLEST SERPL-MCNC: 299 MG/DL (ref 0–199)
CHOLESTEROL/HDL RATIO: 4.5
CO2 SERPL-SCNC: 23 MMOL/L (ref 21–32)
CREAT SERPL-MCNC: 0.87 MG/DL (ref 0.5–1.05)
EGFRCR SERPLBLD CKD-EPI 2021: 70 ML/MIN/1.73M*2
EOSINOPHIL # BLD AUTO: 0.04 X10*3/UL (ref 0–0.4)
EOSINOPHIL NFR BLD AUTO: 0.6 %
ERYTHROCYTE [DISTWIDTH] IN BLOOD BY AUTOMATED COUNT: 13.2 % (ref 11.5–14.5)
EST. AVERAGE GLUCOSE BLD GHB EST-MCNC: 120 MG/DL
FERRITIN SERPL-MCNC: 344 NG/ML (ref 8–150)
GLUCOSE SERPL-MCNC: 111 MG/DL (ref 74–99)
HBA1C MFR BLD: 5.8 %
HCT VFR BLD AUTO: 46.7 % (ref 36–46)
HDLC SERPL-MCNC: 66.5 MG/DL
HGB BLD-MCNC: 15.4 G/DL (ref 12–16)
IMM GRANULOCYTES # BLD AUTO: 0.05 X10*3/UL (ref 0–0.5)
IMM GRANULOCYTES NFR BLD AUTO: 0.8 % (ref 0–0.9)
IRON SATN MFR SERPL: 25 % (ref 25–45)
IRON SERPL-MCNC: 110 UG/DL (ref 35–150)
LDLC SERPL CALC-MCNC: 187 MG/DL
LYMPHOCYTES # BLD AUTO: 1.86 X10*3/UL (ref 0.8–3)
LYMPHOCYTES NFR BLD AUTO: 28.3 %
MCH RBC QN AUTO: 31.7 PG (ref 26–34)
MCHC RBC AUTO-ENTMCNC: 33 G/DL (ref 32–36)
MCV RBC AUTO: 96 FL (ref 80–100)
MONOCYTES # BLD AUTO: 0.47 X10*3/UL (ref 0.05–0.8)
MONOCYTES NFR BLD AUTO: 7.1 %
NEUTROPHILS # BLD AUTO: 4.13 X10*3/UL (ref 1.6–5.5)
NEUTROPHILS NFR BLD AUTO: 62.7 %
NON HDL CHOLESTEROL: 233 MG/DL (ref 0–149)
NRBC BLD-RTO: 0 /100 WBCS (ref 0–0)
PLATELET # BLD AUTO: 293 X10*3/UL (ref 150–450)
POTASSIUM SERPL-SCNC: 4.1 MMOL/L (ref 3.5–5.3)
PROT SERPL-MCNC: 6.8 G/DL (ref 6.4–8.2)
RBC # BLD AUTO: 4.86 X10*6/UL (ref 4–5.2)
SODIUM SERPL-SCNC: 137 MMOL/L (ref 136–145)
TIBC SERPL-MCNC: 436 UG/DL (ref 240–445)
TRIGL SERPL-MCNC: 228 MG/DL (ref 0–149)
UIBC SERPL-MCNC: 326 UG/DL (ref 110–370)
VIT B12 SERPL-MCNC: 706 PG/ML (ref 211–911)
VLDL: 46 MG/DL (ref 0–40)
WBC # BLD AUTO: 6.6 X10*3/UL (ref 4.4–11.3)

## 2024-12-11 PROCEDURE — 36415 COLL VENOUS BLD VENIPUNCTURE: CPT

## 2024-12-11 PROCEDURE — 83550 IRON BINDING TEST: CPT

## 2024-12-11 PROCEDURE — 1123F ACP DISCUSS/DSCN MKR DOCD: CPT | Performed by: STUDENT IN AN ORGANIZED HEALTH CARE EDUCATION/TRAINING PROGRAM

## 2024-12-11 PROCEDURE — 83540 ASSAY OF IRON: CPT

## 2024-12-11 PROCEDURE — 80061 LIPID PANEL: CPT

## 2024-12-11 PROCEDURE — G0439 PPPS, SUBSEQ VISIT: HCPCS | Performed by: STUDENT IN AN ORGANIZED HEALTH CARE EDUCATION/TRAINING PROGRAM

## 2024-12-11 PROCEDURE — 3078F DIAST BP <80 MM HG: CPT | Performed by: STUDENT IN AN ORGANIZED HEALTH CARE EDUCATION/TRAINING PROGRAM

## 2024-12-11 PROCEDURE — 99214 OFFICE O/P EST MOD 30 MIN: CPT | Performed by: STUDENT IN AN ORGANIZED HEALTH CARE EDUCATION/TRAINING PROGRAM

## 2024-12-11 PROCEDURE — 99397 PER PM REEVAL EST PAT 65+ YR: CPT | Performed by: STUDENT IN AN ORGANIZED HEALTH CARE EDUCATION/TRAINING PROGRAM

## 2024-12-11 PROCEDURE — 85025 COMPLETE CBC W/AUTO DIFF WBC: CPT

## 2024-12-11 PROCEDURE — 82607 VITAMIN B-12: CPT

## 2024-12-11 PROCEDURE — 1170F FXNL STATUS ASSESSED: CPT | Performed by: STUDENT IN AN ORGANIZED HEALTH CARE EDUCATION/TRAINING PROGRAM

## 2024-12-11 PROCEDURE — 3075F SYST BP GE 130 - 139MM HG: CPT | Performed by: STUDENT IN AN ORGANIZED HEALTH CARE EDUCATION/TRAINING PROGRAM

## 2024-12-11 PROCEDURE — 83036 HEMOGLOBIN GLYCOSYLATED A1C: CPT

## 2024-12-11 PROCEDURE — 80053 COMPREHEN METABOLIC PANEL: CPT

## 2024-12-11 PROCEDURE — 1159F MED LIST DOCD IN RCRD: CPT | Performed by: STUDENT IN AN ORGANIZED HEALTH CARE EDUCATION/TRAINING PROGRAM

## 2024-12-11 PROCEDURE — 82728 ASSAY OF FERRITIN: CPT

## 2024-12-11 RX ORDER — GABAPENTIN 300 MG/1
300 CAPSULE ORAL NIGHTLY
Qty: 30 CAPSULE | Refills: 0 | Status: SHIPPED | OUTPATIENT
Start: 2024-12-11 | End: 2025-01-10

## 2024-12-11 RX ORDER — IBUPROFEN 600 MG/1
600 TABLET ORAL EVERY 8 HOURS PRN
Qty: 90 TABLET | Refills: 1 | Status: CANCELLED | OUTPATIENT
Start: 2024-12-11

## 2024-12-11 RX ORDER — IBUPROFEN 600 MG/1
600 TABLET ORAL EVERY 8 HOURS PRN
Qty: 90 TABLET | Refills: 0 | Status: SHIPPED | OUTPATIENT
Start: 2024-12-11

## 2024-12-11 ASSESSMENT — ACTIVITIES OF DAILY LIVING (ADL)
TAKING_MEDICATION: INDEPENDENT
DOING_HOUSEWORK: INDEPENDENT
DRESSING: INDEPENDENT
GROCERY_SHOPPING: INDEPENDENT
BATHING: INDEPENDENT
MANAGING_FINANCES: INDEPENDENT

## 2024-12-11 NOTE — ASSESSMENT & PLAN NOTE
Orders:    ibuprofen 600 mg tablet; Take 1 tablet (600 mg) by mouth every 8 hours if needed for moderate pain (4 - 6).

## 2024-12-11 NOTE — PROGRESS NOTES
"Subjective   Reason for Visit: Shawnee Allen is an 74 y.o. female here for a Medicare Wellness visit.     Past Medical, Surgical, and Family History reviewed and updated in chart.    Reviewed all medications by prescribing practitioner or clinical pharmacist (such as prescriptions, OTCs, herbal therapies and supplements) and documented in the medical record.    HPI   Pt feels that her arthritis is doing poorly due to pain     Pt has been self isolating since covid-19. She is staying at home more     Patient Care Team:  Stacy Arroyo DO as PCP - General  Stacy Arroyo DO as PCP - [employer] PCP  Harish Cevallos MD as Consulting Physician (Hematology and Oncology)       Objective   Vitals:  /68   Pulse 94   Ht 1.702 m (5' 7\")   Wt 88.7 kg (195 lb 9.6 oz)   SpO2 97%   BMI 30.64 kg/m²       Physical Exam  Vitals reviewed.   Constitutional:       General: She is not in acute distress.     Appearance: She is not ill-appearing.      Comments: Cane for ambulation   HENT:      Right Ear: Tympanic membrane and ear canal normal.      Left Ear: Tympanic membrane and ear canal normal.      Mouth/Throat:      Mouth: Mucous membranes are moist.      Pharynx: Oropharynx is clear. No oropharyngeal exudate or posterior oropharyngeal erythema.   Eyes:      Extraocular Movements: Extraocular movements intact.      Conjunctiva/sclera: Conjunctivae normal.      Pupils: Pupils are equal, round, and reactive to light.   Neck:      Vascular: No carotid bruit.   Cardiovascular:      Rate and Rhythm: Normal rate and regular rhythm.      Heart sounds: No murmur heard.     No gallop.   Pulmonary:      Effort: Pulmonary effort is normal.      Breath sounds: Normal breath sounds. No wheezing, rhonchi or rales.   Abdominal:      General: Abdomen is flat. Bowel sounds are normal.      Palpations: Abdomen is soft.      Tenderness: There is no abdominal tenderness.   Musculoskeletal:      Cervical back: Neck supple. " "     Left lower leg: No edema.   Skin:     General: Skin is warm and dry.      Findings: No rash.   Neurological:      General: No focal deficit present.      Mental Status: She is alert and oriented to person, place, and time.      Gait: Gait abnormal.   Psychiatric:         Mood and Affect: Mood normal.         Behavior: Behavior normal.       Current Outpatient Medications:     albuterol 90 mcg/actuation inhaler, INHALE 1 TO 2 PUFFS BY MOUTH EVERY 4 TO 6 HOURS AS NEEDED, Disp: 18 g, Rfl: 3    amLODIPine (Norvasc) 10 mg tablet, TAKE 1 TABLET BY MOUTH EVERY DAY, Disp: 90 tablet, Rfl: 1    ascorbic acid (Vitamin C) 500 mg tablet, Take 1 tablet (500 mg) by mouth once daily., Disp: , Rfl:     carbamide peroxide (Debrox) 6.5 % otic solution, Administer 5 drops into affected ear(s)., Disp: , Rfl:     cholecalciferol (Vitamin D-3) 125 MCG (5000 UT) capsule, Take by mouth., Disp: , Rfl:     cyanocobalamin (Vitamin B-12) 1,000 mcg/mL injection, vitamin B12. 1000 mcg subcutaneous injection every 10 days. Disp with needle and syringe for administration, Disp: 3 mL, Rfl: 11    ibuprofen 600 mg tablet, Take 1 tablet (600 mg) by mouth every 8 hours if needed for moderate pain (4 - 6)., Disp: 90 tablet, Rfl: 0    lisinopril 40 mg tablet, Take 1 tablet (40 mg) by mouth once daily., Disp: 90 tablet, Rfl: 0    metoprolol succinate XL (Toprol-XL) 25 mg 24 hr tablet, TAKE 2 TABLETS (50 MG) BY MOUTH ONCE DAILY. DO NOT CRUSH OR CHEW., Disp: 180 tablet, Rfl: 1    syringe with needle 1 mL 25 gauge x 1\" syringe, USE EVERY 2 WEEKS WITH B12 INJECTIONS, Disp: 100 each, Rfl: 0    ezetimibe (Zetia) 10 mg tablet, Take 1 tablet (10 mg) by mouth once daily. (Patient not taking: Reported on 12/11/2024), Disp: 90 tablet, Rfl: 3    gabapentin (Neurontin) 300 mg capsule, Take 1 capsule (300 mg) by mouth once daily at bedtime., Disp: 30 capsule, Rfl: 0    melatonin 10 mg tablet,chewable, Chew., Disp: , Rfl:     pravastatin (PravachoL) 40 mg tablet, " Take 1 tablet (40 mg) by mouth once daily. (Patient not taking: Reported on 11/11/2024), Disp: 30 tablet, Rfl: 11    Assessment & Plan  Routine general medical examination at health care facility         Chronic bilateral low back pain with left-sided sciatica    Orders:    ibuprofen 600 mg tablet; Take 1 tablet (600 mg) by mouth every 8 hours if needed for moderate pain (4 - 6).    Osteoarthritis, unspecified osteoarthritis type, unspecified site    Orders:    gabapentin (Neurontin) 300 mg capsule; Take 1 capsule (300 mg) by mouth once daily at bedtime.    Benign essential hypertension    Orders:    CBC and Auto Differential; Future    Comprehensive metabolic panel; Future    Essential tremor         Pernicious anemia    Orders:    Vitamin B12; Future    Iron and TIBC; Future    Ferritin; Future     74-year-old woman with a history of hypertension, right hemicolectomy and terminal ileum resection (9/12/2014) osteoarthritis, GERD, fatty liver disease on ultrasound of the liver in August 2017, coronary artery disease, malabsorption syndrome,  pernicious anemia and B12 deficiency who presents to 6 month follow up.      HTN  Stable    Metoprolol 50mg daily   Lisinopril 40mg daily   Amlodipine 10mg daily      Physical exam was stable. Discussed maintaining diets such as DASH to help maintain normal Blood pressure. Encouraged regular exercise for a total of 120 min weekly. We will fu labs in 1-3 days. For now there will be no change in medical management. All questions and concerns were addressed. Pt will follow up in 4-6 months or sooner if needed.     OA   Moderate pain   Resistant to her ibuprofen   Trial of gabapentin due to poor sleep, tremor and chronic arthritic pain   I will decline opioid initiation at this time.       B12 deficiency/Pernicious anemia    Pt self administers her B12 at home every 3-4 weeks   Refilled her syringes      Poor balance   Pt to continue her b12 injections   Consider balance physical  therapy    Health Maintenance   Topic Date Due    Medicare Annual Wellness Visit (AWV)  Never done    Bone Density Scan  Never done    Derm Melanoma Skin Check  Never done    Pneumococcal Vaccine: 65+ Years (1 of 2 - PCV) Never done    Hepatitis C Screening  Never done    Hepatitis A Vaccines (1 of 2 - Risk 2-dose series) Never done    DTaP/Tdap/Td Vaccines (1 - Tdap) Never done    Zoster Vaccines (2 of 3) 02/26/2016    RSV High Risk: (Elderly (60+) or Pregnant Population) (1 - 1-dose 75+ series) Never done    Lipid Panel  06/03/2024    Influenza Vaccine (1) Never done    COVID-19 Vaccine (1 - 2024-25 season) Never done    HIB Vaccines  Aged Out    Hepatitis B Vaccines  Aged Out    IPV Vaccines  Aged Out    Meningococcal Vaccine  Aged Out    Rotavirus Vaccines  Aged Out    HPV Vaccines  Aged Out    Mammogram  Discontinued    Colorectal Cancer Screening  Discontinued    Irritable Bowel Syndrome  Discontinued

## 2024-12-18 DIAGNOSIS — E78.2 HYPERLIPIDEMIA, MIXED: Primary | ICD-10-CM

## 2024-12-18 DIAGNOSIS — E78.49 FAMILIAL HYPERLIPIDEMIA: ICD-10-CM

## 2025-01-08 ENCOUNTER — APPOINTMENT (OUTPATIENT)
Dept: HEMATOLOGY/ONCOLOGY | Facility: CLINIC | Age: 75
End: 2025-01-08
Payer: MEDICARE

## 2025-01-17 DIAGNOSIS — I10 BENIGN ESSENTIAL HYPERTENSION: ICD-10-CM

## 2025-01-21 RX ORDER — LISINOPRIL 40 MG/1
40 TABLET ORAL DAILY
Qty: 90 TABLET | Refills: 1 | Status: SHIPPED | OUTPATIENT
Start: 2025-01-21

## 2025-01-27 ENCOUNTER — APPOINTMENT (OUTPATIENT)
Dept: HEMATOLOGY/ONCOLOGY | Facility: CLINIC | Age: 75
End: 2025-01-27
Payer: MEDICARE

## 2025-02-05 DIAGNOSIS — M19.90 OSTEOARTHRITIS, UNSPECIFIED OSTEOARTHRITIS TYPE, UNSPECIFIED SITE: ICD-10-CM

## 2025-02-05 RX ORDER — GABAPENTIN 300 MG/1
300 CAPSULE ORAL NIGHTLY
Qty: 30 CAPSULE | Refills: 0 | Status: SHIPPED | OUTPATIENT
Start: 2025-02-05 | End: 2025-03-07

## 2025-04-06 DIAGNOSIS — D51.0 PERNICIOUS ANEMIA: ICD-10-CM

## 2025-04-07 RX ORDER — SYRINGE WITH NEEDLE, 1 ML 27GX1/2"
SYRINGE, EMPTY DISPOSABLE MISCELLANEOUS
Qty: 100 EACH | Refills: 1 | Status: SHIPPED | OUTPATIENT
Start: 2025-04-07

## 2025-06-03 DIAGNOSIS — E78.2 HYPERLIPIDEMIA, MIXED: ICD-10-CM

## 2025-06-03 DIAGNOSIS — I10 BENIGN ESSENTIAL HYPERTENSION: ICD-10-CM

## 2025-06-03 DIAGNOSIS — I10 HYPERTENSION, UNSPECIFIED TYPE: ICD-10-CM

## 2025-06-03 RX ORDER — METOPROLOL SUCCINATE 25 MG/1
50 TABLET, EXTENDED RELEASE ORAL DAILY
Qty: 180 TABLET | Refills: 1 | Status: SHIPPED | OUTPATIENT
Start: 2025-06-03 | End: 2025-11-30

## 2025-06-03 RX ORDER — AMLODIPINE BESYLATE 10 MG/1
10 TABLET ORAL DAILY
Qty: 90 TABLET | Refills: 1 | Status: SHIPPED | OUTPATIENT
Start: 2025-06-03

## 2025-06-11 ENCOUNTER — APPOINTMENT (OUTPATIENT)
Dept: PRIMARY CARE | Facility: CLINIC | Age: 75
End: 2025-06-11
Payer: MEDICARE

## 2025-06-11 VITALS
DIASTOLIC BLOOD PRESSURE: 88 MMHG | OXYGEN SATURATION: 95 % | SYSTOLIC BLOOD PRESSURE: 144 MMHG | BODY MASS INDEX: 30.79 KG/M2 | WEIGHT: 196.2 LBS | HEIGHT: 67 IN | HEART RATE: 105 BPM

## 2025-06-11 DIAGNOSIS — I10 BENIGN ESSENTIAL HYPERTENSION: Primary | ICD-10-CM

## 2025-06-11 DIAGNOSIS — G25.0 ESSENTIAL TREMOR: ICD-10-CM

## 2025-06-11 DIAGNOSIS — D51.0 VITAMIN B12 DEFICIENCY ANEMIA DUE TO INTRINSIC FACTOR DEFICIENCY: ICD-10-CM

## 2025-06-11 DIAGNOSIS — N95.1 POST MENOPAUSAL SYNDROME: ICD-10-CM

## 2025-06-11 DIAGNOSIS — Z78.0 ASYMPTOMATIC POSTMENOPAUSAL ESTROGEN DEFICIENCY: ICD-10-CM

## 2025-06-11 DIAGNOSIS — M54.42 CHRONIC BILATERAL LOW BACK PAIN WITH LEFT-SIDED SCIATICA: ICD-10-CM

## 2025-06-11 DIAGNOSIS — R73.03 PREDIABETES: ICD-10-CM

## 2025-06-11 DIAGNOSIS — G89.29 CHRONIC BILATERAL LOW BACK PAIN WITH LEFT-SIDED SCIATICA: ICD-10-CM

## 2025-06-11 DIAGNOSIS — D51.0 PERNICIOUS ANEMIA: ICD-10-CM

## 2025-06-11 PROCEDURE — 3078F DIAST BP <80 MM HG: CPT | Performed by: STUDENT IN AN ORGANIZED HEALTH CARE EDUCATION/TRAINING PROGRAM

## 2025-06-11 PROCEDURE — 1123F ACP DISCUSS/DSCN MKR DOCD: CPT | Performed by: STUDENT IN AN ORGANIZED HEALTH CARE EDUCATION/TRAINING PROGRAM

## 2025-06-11 PROCEDURE — G2211 COMPLEX E/M VISIT ADD ON: HCPCS | Performed by: STUDENT IN AN ORGANIZED HEALTH CARE EDUCATION/TRAINING PROGRAM

## 2025-06-11 PROCEDURE — 3077F SYST BP >= 140 MM HG: CPT | Performed by: STUDENT IN AN ORGANIZED HEALTH CARE EDUCATION/TRAINING PROGRAM

## 2025-06-11 PROCEDURE — 1159F MED LIST DOCD IN RCRD: CPT | Performed by: STUDENT IN AN ORGANIZED HEALTH CARE EDUCATION/TRAINING PROGRAM

## 2025-06-11 PROCEDURE — 99214 OFFICE O/P EST MOD 30 MIN: CPT | Performed by: STUDENT IN AN ORGANIZED HEALTH CARE EDUCATION/TRAINING PROGRAM

## 2025-06-11 RX ORDER — SYRINGE WITH NEEDLE, 1 ML 27GX1/2"
SYRINGE, EMPTY DISPOSABLE MISCELLANEOUS
Qty: 100 EACH | Refills: 1 | Status: SHIPPED | OUTPATIENT
Start: 2025-06-11

## 2025-06-11 RX ORDER — CYANOCOBALAMIN 1000 UG/ML
INJECTION, SOLUTION INTRAMUSCULAR; SUBCUTANEOUS
Qty: 3 ML | Refills: 11 | Status: SHIPPED | OUTPATIENT
Start: 2025-06-11

## 2025-06-11 RX ORDER — IBUPROFEN 600 MG/1
600 TABLET, FILM COATED ORAL EVERY 8 HOURS PRN
Qty: 90 TABLET | Refills: 0 | Status: SHIPPED | OUTPATIENT
Start: 2025-06-11

## 2025-06-11 NOTE — PATIENT INSTRUCTIONS
Please monitor for night sweats. Please make sure to    Please cut out carbs at bedtime.     For sleep, try magnesium glycinate 250-400mg nightly, or melatonin 5mg to be taken 30min prior to bedtime.

## 2025-06-11 NOTE — PROGRESS NOTES
"  Subjective   Patient ID:   Shawnee Allen is a  75 y.o. female who presents for Follow-up (Pt is here for a 6 mo follow up.).     HPI   Pt has been told she need cataract removal. Consultation this month.  Pt stopped her gabapentin   Using tylenol arthritis and ibuprofen 400 mg daily    Peteticia and night sweats        Current Medications[1]    Visit Vitals  /88   Pulse 105   Ht 1.702 m (5' 7\")   Wt 89 kg (196 lb 3.2 oz)   SpO2 95%   BMI 30.73 kg/m²   Smoking Status Some Days   BSA 2.05 m²       Objective   Physical Exam  Vitals reviewed.   Constitutional:       Appearance: Normal appearance.   Cardiovascular:      Rate and Rhythm: Normal rate and regular rhythm.      Heart sounds: No murmur heard.  Pulmonary:      Effort: Pulmonary effort is normal. No respiratory distress.      Breath sounds: Normal breath sounds. No wheezing.   Musculoskeletal:      Cervical back: Neck supple.      Left lower leg: No edema.   Neurological:      Mental Status: She is alert.           Assessment/Plan   Diagnoses and all orders for this visit:  Benign essential hypertension  -     CBC and Auto Differential; Future  -     Comprehensive metabolic panel; Future  -     Albumin-Creatinine Ratio, Urine Random; Future  Chronic bilateral low back pain with left-sided sciatica  -     ibuprofen 600 mg tablet; Take 1 tablet (600 mg) by mouth every 8 hours if needed for moderate pain (4 - 6).  -     Hemoglobin A1C; Future  Vitamin B12 deficiency anemia due to intrinsic factor deficiency  -     cyanocobalamin (Vitamin B-12) 1,000 mcg/mL injection; vitamin B12. 1000 mcg subcutaneous injection every 10 days. Disp with needle and syringe for administration  Pernicious anemia  -     syringe with needle 1 mL 25 gauge x 1\" syringe; USE EVERY 2 WEEKS WITH B12 INJECTIONS  Essential tremor  Post menopausal syndrome  -     XR DEXA bone density; Future  Asymptomatic postmenopausal estrogen deficiency  -     XR DEXA bone density; " Future  Prediabetes  -     Hemoglobin A1C; Future    74-year-old woman with a history of hypertension, right hemicolectomy and terminal ileum resection (9/12/2014) osteoarthritis, GERD, fatty liver disease on ultrasound of the liver in August 2017, coronary artery disease, malabsorption syndrome,  pernicious anemia and B12 deficiency who presents to 6 month follow up.      HTN  Not controlled     Metoprolol 50mg daily   Lisinopril 40mg daily   Amlodipine 10mg daily      Physical exam was stable. Discussed maintaining diets such as DASH to help maintain normal Blood pressure. Encouraged regular exercise for a total of 120 min weekly. We will fu labs in 1-3 days. For now there will be no change in medical management. All questions and concerns were addressed. Pt will follow up in 4-6 months or sooner if needed.      OA   Moderate pain   Resistant to her ibuprofen   Trial of gabapentin due to poor sleep, tremor and chronic arthritic pain   I will decline opioid initiation at this time.        B12 deficiency/Pernicious anemia    Pt self administers her B12 at home every 3-4 weeks   Refilled her syringes      Poor balance   Pt to continue her b12 injections   Consider balance physical therapy       Stacy Arroyo DO 03/12/25 2:02 PM          [1]   Current Outpatient Medications:     albuterol 90 mcg/actuation inhaler, INHALE 1 TO 2 PUFFS BY MOUTH EVERY 4 TO 6 HOURS AS NEEDED, Disp: 18 g, Rfl: 3    amLODIPine (Norvasc) 10 mg tablet, TAKE 1 TABLET BY MOUTH EVERY DAY, Disp: 90 tablet, Rfl: 1    ascorbic acid (Vitamin C) 500 mg tablet, Take 1 tablet (500 mg) by mouth once daily., Disp: , Rfl:     cholecalciferol (Vitamin D-3) 125 MCG (5000 UT) capsule, Take by mouth., Disp: , Rfl:     ezetimibe (Zetia) 10 mg tablet, Take 1 tablet (10 mg) by mouth once daily., Disp: 90 tablet, Rfl: 3    lisinopril 40 mg tablet, TAKE 1 TABLET BY MOUTH EVERY DAY, Disp: 90 tablet, Rfl: 1    metoprolol succinate XL (Toprol-XL) 25 mg 24 hr tablet,  "TAKE 2 TABLETS (50 MG) BY MOUTH ONCE DAILY. DO NOT CRUSH OR CHEW., Disp: 180 tablet, Rfl: 1    carbamide peroxide (Debrox) 6.5 % otic solution, Administer 5 drops into affected ear(s). (Patient not taking: Reported on 6/11/2025), Disp: , Rfl:     cyanocobalamin (Vitamin B-12) 1,000 mcg/mL injection, vitamin B12. 1000 mcg subcutaneous injection every 10 days. Disp with needle and syringe for administration, Disp: 3 mL, Rfl: 11    gabapentin (Neurontin) 300 mg capsule, Take 1 capsule (300 mg) by mouth once daily at bedtime., Disp: 30 capsule, Rfl: 0    ibuprofen 600 mg tablet, Take 1 tablet (600 mg) by mouth every 8 hours if needed for moderate pain (4 - 6)., Disp: 90 tablet, Rfl: 0    melatonin 10 mg tablet,chewable, Chew. (Patient not taking: Reported on 6/11/2025), Disp: , Rfl:     pravastatin (PravachoL) 40 mg tablet, Take 1 tablet (40 mg) by mouth once daily. (Patient not taking: Reported on 11/11/2024), Disp: 30 tablet, Rfl: 11    syringe with needle 1 mL 25 gauge x 1\" syringe, USE EVERY 2 WEEKS WITH B12 INJECTIONS, Disp: 100 each, Rfl: 1    "

## 2025-07-18 DIAGNOSIS — G89.29 CHRONIC BILATERAL LOW BACK PAIN WITH LEFT-SIDED SCIATICA: ICD-10-CM

## 2025-07-18 DIAGNOSIS — M54.42 CHRONIC BILATERAL LOW BACK PAIN WITH LEFT-SIDED SCIATICA: ICD-10-CM

## 2025-07-20 RX ORDER — IBUPROFEN 600 MG/1
600 TABLET, FILM COATED ORAL EVERY 8 HOURS PRN
Qty: 90 TABLET | Refills: 0 | Status: SHIPPED | OUTPATIENT
Start: 2025-07-20

## 2025-07-25 DIAGNOSIS — I10 BENIGN ESSENTIAL HYPERTENSION: ICD-10-CM

## 2025-07-28 RX ORDER — LISINOPRIL 40 MG/1
40 TABLET ORAL DAILY
Qty: 90 TABLET | Refills: 1 | Status: SHIPPED | OUTPATIENT
Start: 2025-07-28

## 2025-11-25 ENCOUNTER — APPOINTMENT (OUTPATIENT)
Dept: PRIMARY CARE | Facility: CLINIC | Age: 75
End: 2025-11-25
Payer: MEDICARE